# Patient Record
Sex: FEMALE | Race: WHITE | NOT HISPANIC OR LATINO | Employment: OTHER | URBAN - METROPOLITAN AREA
[De-identification: names, ages, dates, MRNs, and addresses within clinical notes are randomized per-mention and may not be internally consistent; named-entity substitution may affect disease eponyms.]

---

## 2023-03-24 LAB
ALANINE AMINOTRANSFERASE (SGPT) (U/L) IN SER/PLAS: 12 U/L (ref 7–45)
ALBUMIN (G/DL) IN SER/PLAS: 3.6 G/DL (ref 3.4–5)
ALKALINE PHOSPHATASE (U/L) IN SER/PLAS: 65 U/L (ref 33–136)
ANION GAP IN SER/PLAS: 10 MMOL/L (ref 10–20)
ASPARTATE AMINOTRANSFERASE (SGOT) (U/L) IN SER/PLAS: 10 U/L (ref 9–39)
BASOPHILS (10*3/UL) IN BLOOD BY AUTOMATED COUNT: 0.04 X10E9/L (ref 0–0.1)
BASOPHILS/100 LEUKOCYTES IN BLOOD BY AUTOMATED COUNT: 0.7 % (ref 0–2)
BILIRUBIN TOTAL (MG/DL) IN SER/PLAS: 0.6 MG/DL (ref 0–1.2)
CALCIUM (MG/DL) IN SER/PLAS: 9.2 MG/DL (ref 8.6–10.6)
CARBON DIOXIDE, TOTAL (MMOL/L) IN SER/PLAS: 28 MMOL/L (ref 21–32)
CHLORIDE (MMOL/L) IN SER/PLAS: 106 MMOL/L (ref 98–107)
CHOLESTEROL (MG/DL) IN SER/PLAS: 129 MG/DL (ref 0–199)
CHOLESTEROL IN HDL (MG/DL) IN SER/PLAS: 49.6 MG/DL
CHOLESTEROL/HDL RATIO: 2.6
COBALAMIN (VITAMIN B12) (PG/ML) IN SER/PLAS: 335 PG/ML (ref 211–911)
CREATININE (MG/DL) IN SER/PLAS: 1.41 MG/DL (ref 0.5–1.05)
EOSINOPHILS (10*3/UL) IN BLOOD BY AUTOMATED COUNT: 0.17 X10E9/L (ref 0–0.4)
EOSINOPHILS/100 LEUKOCYTES IN BLOOD BY AUTOMATED COUNT: 3.2 % (ref 0–6)
ERYTHROCYTE DISTRIBUTION WIDTH (RATIO) BY AUTOMATED COUNT: 14.8 % (ref 11.5–14.5)
ERYTHROCYTE MEAN CORPUSCULAR HEMOGLOBIN CONCENTRATION (G/DL) BY AUTOMATED: 31.6 G/DL (ref 32–36)
ERYTHROCYTE MEAN CORPUSCULAR VOLUME (FL) BY AUTOMATED COUNT: 96 FL (ref 80–100)
ERYTHROCYTES (10*6/UL) IN BLOOD BY AUTOMATED COUNT: 3.72 X10E12/L (ref 4–5.2)
ESTIMATED AVERAGE GLUCOSE FOR HBA1C: 123 MG/DL
FERRITIN (UG/LL) IN SER/PLAS: 77 UG/L (ref 8–150)
GFR FEMALE: 37 ML/MIN/1.73M2
GLUCOSE (MG/DL) IN SER/PLAS: 99 MG/DL (ref 74–99)
HEMATOCRIT (%) IN BLOOD BY AUTOMATED COUNT: 35.8 % (ref 36–46)
HEMOGLOBIN (G/DL) IN BLOOD: 11.3 G/DL (ref 12–16)
HEMOGLOBIN A1C/HEMOGLOBIN TOTAL IN BLOOD: 5.9 %
IMMATURE GRANULOCYTES/100 LEUKOCYTES IN BLOOD BY AUTOMATED COUNT: 0.4 % (ref 0–0.9)
IRON (UG/DL) IN SER/PLAS: 58 UG/DL (ref 35–150)
IRON BINDING CAPACITY (UG/DL) IN SER/PLAS: 372 UG/DL (ref 240–445)
IRON SATURATION (%) IN SER/PLAS: 16 % (ref 25–45)
LDL: 64 MG/DL (ref 0–99)
LEUKOCYTES (10*3/UL) IN BLOOD BY AUTOMATED COUNT: 5.4 X10E9/L (ref 4.4–11.3)
LYMPHOCYTES (10*3/UL) IN BLOOD BY AUTOMATED COUNT: 1.11 X10E9/L (ref 0.8–3)
LYMPHOCYTES/100 LEUKOCYTES IN BLOOD BY AUTOMATED COUNT: 20.6 % (ref 13–44)
MONOCYTES (10*3/UL) IN BLOOD BY AUTOMATED COUNT: 0.73 X10E9/L (ref 0.05–0.8)
MONOCYTES/100 LEUKOCYTES IN BLOOD BY AUTOMATED COUNT: 13.5 % (ref 2–10)
NEUTROPHILS (10*3/UL) IN BLOOD BY AUTOMATED COUNT: 3.32 X10E9/L (ref 1.6–5.5)
NEUTROPHILS/100 LEUKOCYTES IN BLOOD BY AUTOMATED COUNT: 61.6 % (ref 40–80)
NRBC (PER 100 WBCS) BY AUTOMATED COUNT: 0 /100 WBC (ref 0–0)
PLATELETS (10*3/UL) IN BLOOD AUTOMATED COUNT: 222 X10E9/L (ref 150–450)
POTASSIUM (MMOL/L) IN SER/PLAS: 4.6 MMOL/L (ref 3.5–5.3)
PROTEIN TOTAL: 6.2 G/DL (ref 6.4–8.2)
SODIUM (MMOL/L) IN SER/PLAS: 139 MMOL/L (ref 136–145)
TRIGLYCERIDE (MG/DL) IN SER/PLAS: 78 MG/DL (ref 0–149)
UREA NITROGEN (MG/DL) IN SER/PLAS: 40 MG/DL (ref 6–23)
VLDL: 16 MG/DL (ref 0–40)

## 2023-03-25 LAB — URINE CULTURE: NORMAL

## 2023-10-04 ENCOUNTER — OFFICE VISIT (OUTPATIENT)
Dept: PRIMARY CARE | Facility: CLINIC | Age: 83
End: 2023-10-04
Payer: MEDICARE

## 2023-10-04 ENCOUNTER — LAB (OUTPATIENT)
Dept: LAB | Facility: LAB | Age: 83
End: 2023-10-04
Payer: MEDICARE

## 2023-10-04 VITALS
HEART RATE: 104 BPM | BODY MASS INDEX: 29.59 KG/M2 | WEIGHT: 167 LBS | TEMPERATURE: 97.4 F | OXYGEN SATURATION: 97 % | DIASTOLIC BLOOD PRESSURE: 65 MMHG | HEIGHT: 63 IN | SYSTOLIC BLOOD PRESSURE: 115 MMHG

## 2023-10-04 DIAGNOSIS — R30.0 DYSURIA: ICD-10-CM

## 2023-10-04 DIAGNOSIS — R30.0 DYSURIA: Primary | ICD-10-CM

## 2023-10-04 DIAGNOSIS — J44.9 CHRONIC OBSTRUCTIVE PULMONARY DISEASE, UNSPECIFIED COPD TYPE (MULTI): ICD-10-CM

## 2023-10-04 DIAGNOSIS — N30.00 ACUTE CYSTITIS WITHOUT HEMATURIA: ICD-10-CM

## 2023-10-04 LAB
POC APPEARANCE, URINE: ABNORMAL
POC BILIRUBIN, URINE: NEGATIVE
POC BLOOD, URINE: ABNORMAL
POC COLOR, URINE: YELLOW
POC GLUCOSE, URINE: NEGATIVE MG/DL
POC KETONES, URINE: NEGATIVE MG/DL
POC LEUKOCYTES, URINE: ABNORMAL
POC NITRITE,URINE: NEGATIVE
POC PH, URINE: 5.5 PH
POC PROTEIN, URINE: NEGATIVE MG/DL
POC SPECIFIC GRAVITY, URINE: 1.01
POC UROBILINOGEN, URINE: 0.2 EU/DL

## 2023-10-04 PROCEDURE — 1160F RVW MEDS BY RX/DR IN RCRD: CPT | Performed by: INTERNAL MEDICINE

## 2023-10-04 PROCEDURE — 87186 SC STD MICRODIL/AGAR DIL: CPT

## 2023-10-04 PROCEDURE — 87086 URINE CULTURE/COLONY COUNT: CPT

## 2023-10-04 PROCEDURE — 1036F TOBACCO NON-USER: CPT | Performed by: INTERNAL MEDICINE

## 2023-10-04 PROCEDURE — 1159F MED LIST DOCD IN RCRD: CPT | Performed by: INTERNAL MEDICINE

## 2023-10-04 PROCEDURE — 1125F AMNT PAIN NOTED PAIN PRSNT: CPT | Performed by: INTERNAL MEDICINE

## 2023-10-04 PROCEDURE — 99213 OFFICE O/P EST LOW 20 MIN: CPT | Performed by: INTERNAL MEDICINE

## 2023-10-04 PROCEDURE — 81003 URINALYSIS AUTO W/O SCOPE: CPT | Performed by: INTERNAL MEDICINE

## 2023-10-04 RX ORDER — GUAIFENESIN 1200 MG
325 TABLET, EXTENDED RELEASE 12 HR ORAL 3 TIMES DAILY PRN
COMMUNITY
Start: 2019-02-14

## 2023-10-04 RX ORDER — LISINOPRIL 10 MG/1
TABLET ORAL
COMMUNITY
Start: 2017-11-07

## 2023-10-04 RX ORDER — SPIRONOLACTONE 25 MG/1
1 TABLET ORAL DAILY
COMMUNITY
Start: 2019-04-16 | End: 2023-10-26 | Stop reason: SINTOL

## 2023-10-04 RX ORDER — ALENDRONATE SODIUM 70 MG/1
TABLET ORAL
COMMUNITY
Start: 2022-09-22

## 2023-10-04 RX ORDER — SUCRALFATE 1 G/1
1 TABLET ORAL 3 TIMES DAILY
COMMUNITY
Start: 2022-08-30

## 2023-10-04 RX ORDER — SULFAMETHOXAZOLE AND TRIMETHOPRIM 800; 160 MG/1; MG/1
1 TABLET ORAL 2 TIMES DAILY
Qty: 14 TABLET | Refills: 0 | Status: SHIPPED | OUTPATIENT
Start: 2023-10-04 | End: 2023-10-11

## 2023-10-04 RX ORDER — GABAPENTIN 300 MG/1
300 CAPSULE ORAL 3 TIMES DAILY
COMMUNITY

## 2023-10-04 RX ORDER — ALBUTEROL SULFATE 90 UG/1
AEROSOL, METERED RESPIRATORY (INHALATION)
COMMUNITY
Start: 2018-05-21 | End: 2023-10-19 | Stop reason: SDUPTHER

## 2023-10-04 RX ORDER — RIVAROXABAN 20 MG/1
1 TABLET, FILM COATED ORAL
COMMUNITY
Start: 2015-04-07

## 2023-10-04 RX ORDER — TORSEMIDE 20 MG/1
TABLET ORAL
COMMUNITY
Start: 2022-12-29 | End: 2023-10-26 | Stop reason: SINTOL

## 2023-10-04 RX ORDER — CARVEDILOL 25 MG/1
TABLET ORAL
COMMUNITY
Start: 2017-02-23

## 2023-10-04 RX ORDER — FERROUS SULFATE 325(65) MG
TABLET, DELAYED RELEASE (ENTERIC COATED) ORAL
COMMUNITY

## 2023-10-04 RX ORDER — METHIMAZOLE 5 MG/1
2.5 TABLET ORAL DAILY
COMMUNITY
Start: 2016-08-29

## 2023-10-04 RX ORDER — OMEPRAZOLE 20 MG/1
20 CAPSULE, DELAYED RELEASE ORAL 2 TIMES DAILY
COMMUNITY
Start: 2023-09-25

## 2023-10-04 RX ORDER — FUROSEMIDE 20 MG/1
TABLET ORAL
COMMUNITY
Start: 2021-04-22 | End: 2023-10-04 | Stop reason: ALTCHOICE

## 2023-10-04 RX ORDER — MAGNESIUM 200 MG
1 TABLET ORAL DAILY
COMMUNITY
Start: 2023-03-23

## 2023-10-04 RX ORDER — ROPINIROLE 3 MG/1
3 TABLET, FILM COATED ORAL 2 TIMES DAILY
COMMUNITY
End: 2023-12-12 | Stop reason: SDUPTHER

## 2023-10-04 ASSESSMENT — PATIENT HEALTH QUESTIONNAIRE - PHQ9
SUM OF ALL RESPONSES TO PHQ9 QUESTIONS 1 AND 2: 0
2. FEELING DOWN, DEPRESSED OR HOPELESS: NOT AT ALL
1. LITTLE INTEREST OR PLEASURE IN DOING THINGS: NOT AT ALL

## 2023-10-04 ASSESSMENT — ENCOUNTER SYMPTOMS
DYSURIA: 1
VOMITING: 0
CONSTIPATION: 0
NAUSEA: 1
CHILLS: 1
FEVER: 0
FATIGUE: 1
FREQUENCY: 1

## 2023-10-04 ASSESSMENT — PAIN SCALES - GENERAL: PAINLEVEL: 6

## 2023-10-04 NOTE — PROGRESS NOTES
"Subjective   Patient ID: Yeni Almazan is a 83 y.o. female who presents for Follow-up (Abdominal cramps, urinary frequency, chills ).    HPI     Over the past week - stomach cramps, burning with urination, chills.  Feels \"sick to my stomach.\"  Had a bad night, could not sleep due to discomfort.  Gets a burning feeling to urinate and then has strong urgency to go.  She sometimes feels like she is not emptying bladder all the way.      Review of Systems   Constitutional:  Positive for chills and fatigue. Negative for fever.   Gastrointestinal:  Positive for nausea. Negative for constipation and vomiting.   Genitourinary:  Positive for dysuria, frequency, pelvic pain and urgency.       Objective   /65   Pulse 104   Temp 36.3 °C (97.4 °F) (Temporal)   Ht 1.6 m (5' 3\")   Wt 75.8 kg (167 lb)   SpO2 97%   BMI 29.58 kg/m²     Physical Exam  Constitutional:       Appearance: She is obese.   Cardiovascular:      Rate and Rhythm: Normal rate. Rhythm irregular.   Pulmonary:      Effort: Pulmonary effort is normal. No respiratory distress.      Breath sounds: Normal breath sounds. No wheezing.   Abdominal:      Palpations: Abdomen is soft.      Comments: States with palpation of LLQ, feels sensation that she needs to urinate.  No rebound / guarding.     Musculoskeletal:      Right lower leg: Edema present.      Left lower leg: Edema present.   Neurological:      Mental Status: She is alert and oriented to person, place, and time. Mental status is at baseline.   Psychiatric:         Mood and Affect: Mood normal.     UA in office with positive leukocytes        Assessment/Plan   Problem List Items Addressed This Visit       Chronic obstructive pulmonary disease, unspecified COPD type (CMS/[facility])     Other Visit Diagnoses       Dysuria    -  Primary    Relevant Medications    sulfamethoxazole-trimethoprim (Bactrim DS) 800-160 mg tablet    Other Relevant Orders    POCT UA Automated manually resulted (Completed)    Urine " Culture    Acute cystitis without hematuria              UTI, dysuria - UA with positive leukocytes.  Send urine for culture.  Rx sent for Bactrim x 7 days (states she's taken it in the past with good results).  Follow-up if symptoms worsen or fail to improve.    COPD - no active issues, uses albuterol PRN.    Follow-up as scheduled.

## 2023-10-07 LAB — BACTERIA UR CULT: ABNORMAL

## 2023-10-08 NOTE — RESULT ENCOUNTER NOTE
Please advise patient that urine culture is positive, bacteria is sensitive to Bactrim, so should complete the antibiotic as prescribed.  Dr. Avila

## 2023-10-12 LAB
NON-UH HIE BUN/CREAT RATIO: 26.2
NON-UH HIE BUN: 123 MG/DL (ref 9–23)
NON-UH HIE CALCIUM: 9.4 MG/DL (ref 8.7–10.4)
NON-UH HIE CALCULATED OSMOLALITY: 311 MOSM/KG (ref 275–295)
NON-UH HIE CHLORIDE: 105 MMOL/L (ref 98–107)
NON-UH HIE CO2, VENOUS: 23 MMOL/L (ref 20–31)
NON-UH HIE CREATININE: 4.7 MG/DL (ref 0.5–0.8)
NON-UH HIE GFR AA: 11
NON-UH HIE GLOMERULAR FILTRATION RATE: 9 ML/MIN/1.73M?
NON-UH HIE GLUCOSE: 116 MG/DL (ref 74–106)
NON-UH HIE K: 6 MMOL/L (ref 3.5–5.1)
NON-UH HIE NA: 135 MMOL/L (ref 135–145)

## 2023-10-13 ENCOUNTER — DOCUMENTATION (OUTPATIENT)
Dept: RHEUMATOLOGY | Facility: HOSPITAL | Age: 83
End: 2023-10-13
Payer: COMMERCIAL

## 2023-10-19 ENCOUNTER — TELEPHONE (OUTPATIENT)
Dept: PRIMARY CARE | Facility: CLINIC | Age: 83
End: 2023-10-19
Payer: COMMERCIAL

## 2023-10-19 DIAGNOSIS — N30.00 ACUTE CYSTITIS WITHOUT HEMATURIA: Primary | ICD-10-CM

## 2023-10-19 DIAGNOSIS — R30.0 DYSURIA: ICD-10-CM

## 2023-10-19 DIAGNOSIS — J44.9 CHRONIC OBSTRUCTIVE PULMONARY DISEASE, UNSPECIFIED COPD TYPE (MULTI): Primary | ICD-10-CM

## 2023-10-19 RX ORDER — CIPROFLOXACIN 250 MG/1
250 TABLET, FILM COATED ORAL 2 TIMES DAILY
Qty: 14 TABLET | Refills: 0 | Status: SHIPPED | OUTPATIENT
Start: 2023-10-19 | End: 2023-10-26

## 2023-10-19 RX ORDER — ALBUTEROL SULFATE 90 UG/1
2 AEROSOL, METERED RESPIRATORY (INHALATION) EVERY 6 HOURS PRN
Qty: 18 G | Refills: 3 | Status: SHIPPED | OUTPATIENT
Start: 2023-10-19

## 2023-10-19 NOTE — TELEPHONE ENCOUNTER
Patient called to schedule hospital followup but needs a refill on her albuterol inhaler.    RX  JANA DEMPSEY

## 2023-10-23 ENCOUNTER — TELEPHONE (OUTPATIENT)
Dept: PRIMARY CARE | Facility: CLINIC | Age: 83
End: 2023-10-23
Payer: COMMERCIAL

## 2023-10-23 NOTE — TELEPHONE ENCOUNTER
Patient left message on non urgent line stating she is taking cipro for urine infection.  Patient she doesn't feel the medication is helping.  Patient still has burning and sometimes it is real bad.  Patient phone [phone].

## 2023-10-24 PROBLEM — J44.9 COPD (CHRONIC OBSTRUCTIVE PULMONARY DISEASE) (MULTI): Status: ACTIVE | Noted: 2023-10-24

## 2023-10-24 PROBLEM — E05.90 THYROTOXICOSIS: Status: ACTIVE | Noted: 2023-10-24

## 2023-10-24 PROBLEM — M40.209 KYPHOSIS: Status: ACTIVE | Noted: 2023-10-24

## 2023-10-24 PROBLEM — N32.81 OVERACTIVE BLADDER: Status: ACTIVE | Noted: 2023-10-24

## 2023-10-24 PROBLEM — M79.669 CALF PAIN: Status: ACTIVE | Noted: 2023-10-24

## 2023-10-24 PROBLEM — N17.9 ACUTE KIDNEY INJURY (CMS-HCC): Status: ACTIVE | Noted: 2023-10-13

## 2023-10-24 PROBLEM — K21.9 GASTROESOPHAGEAL REFLUX DISEASE: Status: ACTIVE | Noted: 2023-10-24

## 2023-10-24 PROBLEM — F41.9 ANXIETY: Status: ACTIVE | Noted: 2023-10-24

## 2023-10-24 PROBLEM — H35.30 MACULAR DEGENERATION: Status: ACTIVE | Noted: 2023-10-24

## 2023-10-24 PROBLEM — M19.90 ARTHRITIS: Status: ACTIVE | Noted: 2023-10-24

## 2023-10-24 PROBLEM — E05.90 HYPERTHYROIDISM: Status: ACTIVE | Noted: 2023-09-29

## 2023-10-24 PROBLEM — G62.9 PERIPHERAL NEUROPATHY: Status: ACTIVE | Noted: 2023-10-24

## 2023-10-24 PROBLEM — Z86.73 HISTORY OF CVA (CEREBROVASCULAR ACCIDENT): Status: ACTIVE | Noted: 2023-10-24

## 2023-10-24 PROBLEM — R25.2 LEG CRAMPS: Status: ACTIVE | Noted: 2023-10-24

## 2023-10-24 PROBLEM — T46.2X5A AMIODARONE-INDUCED HYPERTHYROIDISM: Status: ACTIVE | Noted: 2023-10-24

## 2023-10-24 PROBLEM — I10 BENIGN ESSENTIAL HYPERTENSION: Status: ACTIVE | Noted: 2017-02-23

## 2023-10-24 PROBLEM — G25.81 RESTLESS LEGS SYNDROME: Status: ACTIVE | Noted: 2023-10-24

## 2023-10-24 PROBLEM — J30.9 ALLERGIC RHINITIS: Status: ACTIVE | Noted: 2023-10-24

## 2023-10-24 PROBLEM — E04.2 MULTINODULAR GOITER: Status: ACTIVE | Noted: 2023-09-29

## 2023-10-24 PROBLEM — M79.673 CHRONIC FOOT PAIN: Status: ACTIVE | Noted: 2023-10-24

## 2023-10-24 PROBLEM — N39.0 RECURRENT URINARY TRACT INFECTION: Status: ACTIVE | Noted: 2023-10-24

## 2023-10-24 PROBLEM — E53.8 VITAMIN B 12 DEFICIENCY: Status: ACTIVE | Noted: 2023-10-24

## 2023-10-24 PROBLEM — M81.0 OSTEOPOROSIS: Status: ACTIVE | Noted: 2023-09-29

## 2023-10-24 PROBLEM — I10 HIGH BLOOD PRESSURE: Status: ACTIVE | Noted: 2023-10-24

## 2023-10-24 PROBLEM — E04.1 THYROID NODULE: Status: ACTIVE | Noted: 2023-10-24

## 2023-10-24 PROBLEM — R06.02 SHORTNESS OF BREATH ON EXERTION: Status: ACTIVE | Noted: 2023-10-24

## 2023-10-24 PROBLEM — K44.9 HIATAL HERNIA: Status: ACTIVE | Noted: 2023-10-24

## 2023-10-24 PROBLEM — M79.89 CALF SWELLING: Status: ACTIVE | Noted: 2023-10-24

## 2023-10-24 PROBLEM — R73.01 IMPAIRED FASTING GLUCOSE: Status: ACTIVE | Noted: 2023-10-24

## 2023-10-24 PROBLEM — G89.29 CHRONIC FOOT PAIN: Status: ACTIVE | Noted: 2023-10-24

## 2023-10-24 PROBLEM — M54.9 UPPER BACK PAIN: Status: ACTIVE | Noted: 2023-10-24

## 2023-10-24 PROBLEM — I51.9 LEFT VENTRICULAR DIASTOLIC DYSFUNCTION: Status: ACTIVE | Noted: 2023-10-24

## 2023-10-24 PROBLEM — M51.34 DEGENERATION OF INTERVERTEBRAL DISC OF THORACIC REGION: Status: ACTIVE | Noted: 2023-10-24

## 2023-10-24 PROBLEM — E05.80 AMIODARONE-INDUCED HYPERTHYROIDISM: Status: ACTIVE | Noted: 2023-10-24

## 2023-10-24 PROBLEM — I63.9 STROKE (MULTI): Status: ACTIVE | Noted: 2023-10-24

## 2023-10-24 PROBLEM — R11.0 MODERATE NAUSEA: Status: ACTIVE | Noted: 2023-10-24

## 2023-10-24 PROBLEM — Z99.89 AMBULATES WITH CANE: Status: ACTIVE | Noted: 2023-10-24

## 2023-10-24 PROBLEM — E04.1 SOLITARY THYROID NODULE: Status: ACTIVE | Noted: 2023-10-24

## 2023-10-24 PROBLEM — R60.9 DEPENDENT EDEMA: Status: ACTIVE | Noted: 2023-10-24

## 2023-10-24 PROBLEM — E87.5 HYPERKALEMIA: Status: ACTIVE | Noted: 2023-10-13

## 2023-10-24 RX ORDER — BETAMETHASONE DIPROPIONATE 0.5 MG/G
CREAM TOPICAL 2 TIMES DAILY
COMMUNITY
Start: 2023-09-20

## 2023-10-24 RX ORDER — FUROSEMIDE 20 MG/1
20 TABLET ORAL DAILY
COMMUNITY
Start: 2023-10-12 | End: 2023-10-26

## 2023-10-24 RX ORDER — MULTIVITAMIN
1 TABLET ORAL DAILY
COMMUNITY
Start: 2023-10-13

## 2023-10-24 RX ORDER — SPIRONOLACTONE AND HYDROCHLOROTHIAZIDE 25; 25 MG/1; MG/1
1 TABLET ORAL DAILY
COMMUNITY
Start: 2018-10-29 | End: 2023-10-26 | Stop reason: SINTOL

## 2023-10-24 RX ORDER — HYDROCHLOROTHIAZIDE 25 MG/1
25 TABLET ORAL DAILY
COMMUNITY
Start: 2023-10-16 | End: 2023-11-15 | Stop reason: SDUPTHER

## 2023-10-25 PROBLEM — R20.0 BILATERAL HAND NUMBNESS: Status: ACTIVE | Noted: 2023-10-25

## 2023-10-25 RX ORDER — LOSARTAN POTASSIUM 100 MG/1
100 TABLET ORAL
COMMUNITY
End: 2023-10-26

## 2023-10-25 RX ORDER — OXYBUTYNIN CHLORIDE 5 MG/1
5 TABLET ORAL NIGHTLY
COMMUNITY
End: 2023-11-13 | Stop reason: ALTCHOICE

## 2023-10-25 RX ORDER — MAG CARB/ALUMINUM HYDROX/ALGIN 358-95/15
SUSPENSION, ORAL (FINAL DOSE FORM) ORAL AS NEEDED
COMMUNITY

## 2023-10-26 ENCOUNTER — OFFICE VISIT (OUTPATIENT)
Dept: PRIMARY CARE | Facility: CLINIC | Age: 83
End: 2023-10-26
Payer: MEDICARE

## 2023-10-26 ENCOUNTER — LAB (OUTPATIENT)
Dept: LAB | Facility: LAB | Age: 83
End: 2023-10-26
Payer: MEDICARE

## 2023-10-26 VITALS
OXYGEN SATURATION: 93 % | HEART RATE: 90 BPM | TEMPERATURE: 97.3 F | DIASTOLIC BLOOD PRESSURE: 66 MMHG | SYSTOLIC BLOOD PRESSURE: 118 MMHG

## 2023-10-26 DIAGNOSIS — E87.5 HYPERKALEMIA: ICD-10-CM

## 2023-10-26 DIAGNOSIS — N30.00 ACUTE CYSTITIS WITHOUT HEMATURIA: ICD-10-CM

## 2023-10-26 DIAGNOSIS — R30.0 DYSURIA: Primary | ICD-10-CM

## 2023-10-26 LAB
POC APPEARANCE, URINE: ABNORMAL
POC BILIRUBIN, URINE: NEGATIVE
POC BLOOD, URINE: ABNORMAL
POC COLOR, URINE: YELLOW
POC GLUCOSE, URINE: NEGATIVE MG/DL
POC KETONES, URINE: NEGATIVE MG/DL
POC LEUKOCYTES, URINE: ABNORMAL
POC NITRITE,URINE: POSITIVE
POC PH, URINE: 5.5 PH
POC PROTEIN, URINE: NEGATIVE MG/DL
POC SPECIFIC GRAVITY, URINE: <=1.005
POC UROBILINOGEN, URINE: 0.2 EU/DL

## 2023-10-26 PROCEDURE — 36415 COLL VENOUS BLD VENIPUNCTURE: CPT

## 2023-10-26 PROCEDURE — 1036F TOBACCO NON-USER: CPT | Performed by: INTERNAL MEDICINE

## 2023-10-26 PROCEDURE — 80048 BASIC METABOLIC PNL TOTAL CA: CPT

## 2023-10-26 PROCEDURE — 1160F RVW MEDS BY RX/DR IN RCRD: CPT | Performed by: INTERNAL MEDICINE

## 2023-10-26 PROCEDURE — 81003 URINALYSIS AUTO W/O SCOPE: CPT | Performed by: INTERNAL MEDICINE

## 2023-10-26 PROCEDURE — 1125F AMNT PAIN NOTED PAIN PRSNT: CPT | Performed by: INTERNAL MEDICINE

## 2023-10-26 PROCEDURE — 99213 OFFICE O/P EST LOW 20 MIN: CPT | Performed by: INTERNAL MEDICINE

## 2023-10-26 PROCEDURE — 3074F SYST BP LT 130 MM HG: CPT | Performed by: INTERNAL MEDICINE

## 2023-10-26 PROCEDURE — 87086 URINE CULTURE/COLONY COUNT: CPT

## 2023-10-26 PROCEDURE — 1159F MED LIST DOCD IN RCRD: CPT | Performed by: INTERNAL MEDICINE

## 2023-10-26 PROCEDURE — 3078F DIAST BP <80 MM HG: CPT | Performed by: INTERNAL MEDICINE

## 2023-10-26 PROCEDURE — 87186 SC STD MICRODIL/AGAR DIL: CPT

## 2023-10-26 RX ORDER — CIPROFLOXACIN 250 MG/1
250 TABLET, FILM COATED ORAL 2 TIMES DAILY
Qty: 14 TABLET | Refills: 0 | Status: SHIPPED | OUTPATIENT
Start: 2023-10-26 | End: 2023-11-02

## 2023-10-26 ASSESSMENT — PATIENT HEALTH QUESTIONNAIRE - PHQ9
2. FEELING DOWN, DEPRESSED OR HOPELESS: NOT AT ALL
SUM OF ALL RESPONSES TO PHQ9 QUESTIONS 1 AND 2: 0
1. LITTLE INTEREST OR PLEASURE IN DOING THINGS: NOT AT ALL

## 2023-10-26 NOTE — PROGRESS NOTES
Subjective   Patient ID: Yeni Almazan is a 83 y.o. female who presents for Hospital Follow-up / UTI symptoms.    HPI     Seen in office 10/4 for UTI symptoms.  UA suggested UTI, started on Bactrim.  Urine culture came back showing Klebsiella pneumoniae, sensitive to Bactrim.    In the meantime, she went for labs for another provider.  This showed hyperkalemia and increase in creatinine.  She was advised to go to ER.  She was hospitalized 10/12 - 10/16.  Urine culture on 10/13 showed no growth.    She called and spoke with Dr. Olivier on 10/19 - he called in another course of Cipro.    Primary symptom today is burning with urination.  It's very painful.  No gross blood in urine.  Feels cold at times, however no fever or rigors.  When she had called office previously, I was concerned that her urine culture was negative and she was taking antibiotics without relief, suggesting cause other than UTI.  I put in a referral to a urologist.   She is scheduled for 11/1.        Review of Systems   Constitutional:  Positive for fatigue. Negative for fever.   Genitourinary:  Positive for dysuria, frequency and urgency. Negative for hematuria.   Psychiatric/Behavioral:  Negative for confusion.        Objective   /66   Pulse 90   Temp 36.3 °C (97.3 °F)   SpO2 93%     Physical Exam  Cardiovascular:      Rate and Rhythm: Normal rate and regular rhythm.      Heart sounds: Normal heart sounds. No murmur heard.  Pulmonary:      Effort: Pulmonary effort is normal. No respiratory distress.      Breath sounds: Normal breath sounds. No wheezing.   Neurological:      General: No focal deficit present.      Mental Status: She is alert and oriented to person, place, and time. Mental status is at baseline.   Psychiatric:         Mood and Affect: Mood normal.         Assessment/Plan   Problem List Items Addressed This Visit             ICD-10-CM    Hyperkalemia E87.5    Relevant Orders    Basic metabolic panel (Completed)     Other  Visit Diagnoses         Codes    Dysuria    -  Primary R30.0    Relevant Orders    POCT UA Automated manually resulted (Completed)    Acute cystitis without hematuria     N30.00    Relevant Medications    ciprofloxacin (Cipro) 250 mg tablet    Other Relevant Orders    Urine Culture            Dysuria - UA today suggests UTI.  Urine sent for culture.  Rx sent for Cipro.  Keep appointment with urologist.    Hyperkalemia, recent acute renal failure - recheck BMP today.    Follow-up as scheduled.

## 2023-10-27 LAB
ANION GAP SERPL CALC-SCNC: 13 MMOL/L (ref 10–20)
BUN SERPL-MCNC: 22 MG/DL (ref 6–23)
CALCIUM SERPL-MCNC: 9.1 MG/DL (ref 8.6–10.6)
CHLORIDE SERPL-SCNC: 101 MMOL/L (ref 98–107)
CO2 SERPL-SCNC: 27 MMOL/L (ref 21–32)
CREAT SERPL-MCNC: 1.12 MG/DL (ref 0.5–1.05)
GFR SERPL CREATININE-BSD FRML MDRD: 49 ML/MIN/1.73M*2
GLUCOSE SERPL-MCNC: 98 MG/DL (ref 74–99)
POTASSIUM SERPL-SCNC: 4.6 MMOL/L (ref 3.5–5.3)
SODIUM SERPL-SCNC: 136 MMOL/L (ref 136–145)

## 2023-10-27 ASSESSMENT — ENCOUNTER SYMPTOMS
FREQUENCY: 1
HEMATURIA: 0
FATIGUE: 1
DYSURIA: 1
FEVER: 0
CONFUSION: 0

## 2023-10-29 LAB — BACTERIA UR CULT: ABNORMAL

## 2023-10-30 DIAGNOSIS — N30.00 ACUTE CYSTITIS WITHOUT HEMATURIA: Primary | ICD-10-CM

## 2023-10-30 RX ORDER — NITROFURANTOIN 25; 75 MG/1; MG/1
100 CAPSULE ORAL 2 TIMES DAILY
Qty: 14 CAPSULE | Refills: 0 | Status: SHIPPED | OUTPATIENT
Start: 2023-10-30 | End: 2023-11-06

## 2023-11-01 ENCOUNTER — APPOINTMENT (OUTPATIENT)
Dept: UROLOGY | Facility: CLINIC | Age: 83
End: 2023-11-01
Payer: MEDICARE

## 2023-11-13 ENCOUNTER — OFFICE VISIT (OUTPATIENT)
Dept: UROLOGY | Facility: CLINIC | Age: 83
End: 2023-11-13
Payer: MEDICARE

## 2023-11-13 VITALS
SYSTOLIC BLOOD PRESSURE: 171 MMHG | HEART RATE: 120 BPM | WEIGHT: 167 LBS | DIASTOLIC BLOOD PRESSURE: 97 MMHG | BODY MASS INDEX: 29.59 KG/M2 | HEIGHT: 63 IN | TEMPERATURE: 96.6 F

## 2023-11-13 DIAGNOSIS — N95.8 GENITOURINARY SYNDROME OF MENOPAUSE: ICD-10-CM

## 2023-11-13 DIAGNOSIS — R30.0 DYSURIA: ICD-10-CM

## 2023-11-13 DIAGNOSIS — N32.81 OAB (OVERACTIVE BLADDER): Primary | ICD-10-CM

## 2023-11-13 DIAGNOSIS — R35.0 FREQUENCY OF URINATION: ICD-10-CM

## 2023-11-13 LAB
APPEARANCE UR: ABNORMAL
BACTERIA #/AREA URNS AUTO: ABNORMAL /HPF
BILIRUB UR STRIP.AUTO-MCNC: NEGATIVE MG/DL
COLOR UR: YELLOW
GLUCOSE UR STRIP.AUTO-MCNC: NEGATIVE MG/DL
KETONES UR STRIP.AUTO-MCNC: ABNORMAL MG/DL
LEUKOCYTE ESTERASE UR QL STRIP.AUTO: ABNORMAL
NITRITE UR QL STRIP.AUTO: NEGATIVE
PH UR STRIP.AUTO: 5 [PH]
POC APPEARANCE, URINE: CLEAR
POC BILIRUBIN, URINE: NEGATIVE
POC BLOOD, URINE: ABNORMAL
POC COLOR, URINE: YELLOW
POC GLUCOSE, URINE: NEGATIVE MG/DL
POC KETONES, URINE: ABNORMAL MG/DL
POC LEUKOCYTES, URINE: ABNORMAL
POC NITRITE,URINE: NEGATIVE
POC PH, URINE: 5.5 PH
POC PROTEIN, URINE: ABNORMAL MG/DL
POC SPECIFIC GRAVITY, URINE: 1.02
POC UROBILINOGEN, URINE: 0.2 EU/DL
PROT UR STRIP.AUTO-MCNC: ABNORMAL MG/DL
RBC # UR STRIP.AUTO: ABNORMAL /UL
RBC #/AREA URNS AUTO: ABNORMAL /HPF
SP GR UR STRIP.AUTO: 1.01
SQUAMOUS #/AREA URNS AUTO: ABNORMAL /HPF
UROBILINOGEN UR STRIP.AUTO-MCNC: <2 MG/DL
WBC #/AREA URNS AUTO: >50 /HPF

## 2023-11-13 PROCEDURE — 1159F MED LIST DOCD IN RCRD: CPT | Performed by: NURSE PRACTITIONER

## 2023-11-13 PROCEDURE — 1036F TOBACCO NON-USER: CPT | Performed by: NURSE PRACTITIONER

## 2023-11-13 PROCEDURE — 81001 URINALYSIS AUTO W/SCOPE: CPT

## 2023-11-13 PROCEDURE — 3080F DIAST BP >= 90 MM HG: CPT | Performed by: NURSE PRACTITIONER

## 2023-11-13 PROCEDURE — 87086 URINE CULTURE/COLONY COUNT: CPT

## 2023-11-13 PROCEDURE — 81003 URINALYSIS AUTO W/O SCOPE: CPT | Performed by: NURSE PRACTITIONER

## 2023-11-13 PROCEDURE — 99214 OFFICE O/P EST MOD 30 MIN: CPT | Performed by: NURSE PRACTITIONER

## 2023-11-13 PROCEDURE — 3077F SYST BP >= 140 MM HG: CPT | Performed by: NURSE PRACTITIONER

## 2023-11-13 PROCEDURE — 51798 US URINE CAPACITY MEASURE: CPT | Performed by: NURSE PRACTITIONER

## 2023-11-13 PROCEDURE — 1125F AMNT PAIN NOTED PAIN PRSNT: CPT | Performed by: NURSE PRACTITIONER

## 2023-11-13 PROCEDURE — 1160F RVW MEDS BY RX/DR IN RCRD: CPT | Performed by: NURSE PRACTITIONER

## 2023-11-13 RX ORDER — SOLIFENACIN SUCCINATE 5 MG/1
5 TABLET, FILM COATED ORAL DAILY
Qty: 30 TABLET | Refills: 5 | Status: SHIPPED | OUTPATIENT
Start: 2023-11-13 | End: 2024-01-03 | Stop reason: ALTCHOICE

## 2023-11-13 RX ORDER — ESTRADIOL 0.1 MG/G
CREAM VAGINAL
Qty: 42.5 G | Refills: 5 | Status: SHIPPED | OUTPATIENT
Start: 2023-11-13 | End: 2024-11-12

## 2023-11-13 NOTE — PROGRESS NOTES
"11/13/23   [id number]    Overactive bladder, two UTI recently     Subjective      HPI Yeni Almazan is a 83 y.o. female who presents for overactive bladder and frequent UTI recently in October, previously no UTI for a while, recently on two abx for UTI (bactrim and Cipro); yesterday noticed small burning, urgency and frequency about the same 8 pads per day; no constipation;     UA small heme, large leuk, PVR 54 ml    No hematuria, maybe sensation of prolapse?    Uses cane, son brought in wheel chair for visit;     10/26/23 Ecoli  10/4/23 klebsiella  3/23/23 mixed urethral felice        Objective     BP (!) 171/97 (BP Location: Right arm, Patient Position: Sitting, BP Cuff Size: Large adult)   Pulse (!) 120   Temp 35.9 °C (96.6 °F) (Temporal)   Ht 1.6 m (5' 3\")   Wt 75.8 kg (167 lb)   BMI 29.58 kg/m²    Physical Exam  General: Appears comfortable and in no apparent distress, well nourished  Head: Normocephalic, atraumatic  Neck: trachea midline  Respiratory: respirations unlabored, no wheezes, and no use of accessory muscles  Cardiovascular: at rest no dyspnea, well perfused  Skin: no visible rashes or lesions  Neurologic: grossly intact, oriented to person, place, and time  Psychiatric: mood and affect appropriate  Musculoskeletal: in chair for appt. no difficulty w upper body movement, uses cane to ambulate;       Assessment/Plan   Problem List Items Addressed This Visit    None  Visit Diagnoses       OAB (overactive bladder)    -  Primary    Relevant Orders    Post-Void Residual (Completed)    Dysuria        Relevant Orders    POCT UA Automated manually resulted (Completed)    Frequency of urination        Relevant Orders    Post-Void Residual (Completed)          Orders Placed This Encounter   Procedures    Post-Void Residual    POCT UA Automated manually resulted     Order Specific Question:   Release result to Fiksu     Answer:   Immediate [1]             Solifenacin 5 mg daily with breakfast  Estrogen cream " pea on finger nightly x 3 weeks, then 3 x per week thereafter  Dmanose 2000 mg daily (amazon powder or pill) for  UTI prevention  Follow up Noem[facility] 4-6 weeks pelvic exam, PVR  Nurse line [phone]  Lab Results   Component Value Date    GLUCOSE 98 10/26/2023    CALCIUM 9.1 10/26/2023     10/26/2023    K 4.6 10/26/2023    CO2 27 10/26/2023     10/26/2023    BUN 22 10/26/2023    CREATININE 1.12 (H) 10/26/2023

## 2023-11-13 NOTE — PATIENT INSTRUCTIONS
Solifenacin 5 mg daily with breakfast  Estrogen cream pea on finger nightly x 3 weeks, then 3 x per week thereafter  Dmanose 2000 mg daily (amazon powder or pill) for  UTI prevention  Follow up Noem[facility] 4-6 weeks pelvic exam, PVR  Nurse line [phone]

## 2023-11-14 LAB — BACTERIA UR CULT: NORMAL

## 2023-11-15 ENCOUNTER — TELEPHONE (OUTPATIENT)
Dept: PRIMARY CARE | Facility: CLINIC | Age: 83
End: 2023-11-15
Payer: COMMERCIAL

## 2023-11-15 ENCOUNTER — TELEPHONE (OUTPATIENT)
Dept: UROLOGY | Facility: CLINIC | Age: 83
End: 2023-11-15
Payer: COMMERCIAL

## 2023-11-15 DIAGNOSIS — I10 BENIGN ESSENTIAL HYPERTENSION: Primary | ICD-10-CM

## 2023-11-15 RX ORDER — HYDROCHLOROTHIAZIDE 25 MG/1
25 TABLET ORAL DAILY
Qty: 90 TABLET | Refills: 3 | Status: SHIPPED | OUTPATIENT
Start: 2023-11-15 | End: 2024-11-14

## 2023-11-15 NOTE — TELEPHONE ENCOUNTER
Patient was put on hydrochlorothiazide 25 mg  in the hospital.  She assumes that she should still be on it but she is out    RX  GE BR

## 2023-11-15 NOTE — TELEPHONE ENCOUNTER
----- Message from JEFF Jean sent at 11/15/2023  5:04 PM EST -----  Infection cleared up, ty  ----- Message -----  From: Hari Moore MA  Sent: 11/13/2023   9:46 AM EST  To: JEFF Jean

## 2023-12-12 ENCOUNTER — TELEPHONE (OUTPATIENT)
Dept: PRIMARY CARE | Facility: CLINIC | Age: 83
End: 2023-12-12
Payer: COMMERCIAL

## 2023-12-12 DIAGNOSIS — G25.81 RESTLESS LEGS SYNDROME: Primary | ICD-10-CM

## 2023-12-12 RX ORDER — ROPINIROLE 3 MG/1
3 TABLET, FILM COATED ORAL 2 TIMES DAILY
Qty: 60 TABLET | Refills: 11 | Status: SHIPPED | OUTPATIENT
Start: 2023-12-12

## 2023-12-12 NOTE — TELEPHONE ENCOUNTER
[facility] Fax Refill Authorization Request-  Rx refill-ropinirole HCl Oral Tablet 3 mg take one tablet by mouth two times a day  Orig Qty 60  Org refills 11  Ph-Cristo Jose [phone]

## 2023-12-18 ENCOUNTER — TELEPHONE (OUTPATIENT)
Dept: UROLOGY | Facility: CLINIC | Age: 83
End: 2023-12-18
Payer: COMMERCIAL

## 2023-12-18 DIAGNOSIS — R30.0 DYSURIA: Primary | ICD-10-CM

## 2023-12-18 NOTE — PROGRESS NOTES
Pt left message stating she has another UTI. Orders placed and calling pt to have her drop off a urine. Please advise on treatment, thanks.

## 2023-12-18 NOTE — TELEPHONE ENCOUNTER
Pt left message stating she has nother UTI. Orders placed and calling pt to have her drop off urine. Please advise on treatment, thanks.

## 2023-12-20 ENCOUNTER — LAB (OUTPATIENT)
Dept: LAB | Facility: LAB | Age: 83
End: 2023-12-20
Payer: MEDICARE

## 2023-12-20 DIAGNOSIS — R30.0 DYSURIA: ICD-10-CM

## 2023-12-20 LAB
MUCOUS THREADS #/AREA URNS AUTO: NORMAL /LPF
RBC #/AREA URNS AUTO: NORMAL /HPF
SQUAMOUS #/AREA URNS AUTO: NORMAL /HPF
WBC #/AREA URNS AUTO: NORMAL /HPF

## 2023-12-20 PROCEDURE — 81001 URINALYSIS AUTO W/SCOPE: CPT

## 2023-12-20 PROCEDURE — 87086 URINE CULTURE/COLONY COUNT: CPT

## 2023-12-21 LAB — BACTERIA UR CULT: NORMAL

## 2023-12-21 NOTE — TELEPHONE ENCOUNTER
"Pt informed and states her incontinence is the worst part. Every time she stands up \"it splashes out.\" I told her we can discuss that further at her follow up appt on jan. 3rd and we will check her urine again when she is here.  "

## 2023-12-21 NOTE — TELEPHONE ENCOUNTER
----- Message from JEFF Jean sent at 12/21/2023  5:02 PM EST -----  No infection. ty  ----- Message -----  From: Lab, Background User  Sent: 12/20/2023   8:46 PM EST  To: JEFF Jean

## 2024-01-03 ENCOUNTER — OFFICE VISIT (OUTPATIENT)
Dept: UROLOGY | Facility: CLINIC | Age: 84
End: 2024-01-03
Payer: MEDICARE

## 2024-01-03 VITALS
BODY MASS INDEX: 29.58 KG/M2 | SYSTOLIC BLOOD PRESSURE: 177 MMHG | WEIGHT: 167 LBS | DIASTOLIC BLOOD PRESSURE: 108 MMHG | HEART RATE: 119 BPM | TEMPERATURE: 96.6 F

## 2024-01-03 DIAGNOSIS — N95.8 GENITOURINARY SYNDROME OF MENOPAUSE: ICD-10-CM

## 2024-01-03 DIAGNOSIS — N39.0 RECURRENT UTI: ICD-10-CM

## 2024-01-03 DIAGNOSIS — R35.0 FREQUENCY OF URINATION: Primary | ICD-10-CM

## 2024-01-03 LAB
POC APPEARANCE, URINE: CLEAR
POC BILIRUBIN, URINE: NEGATIVE
POC BLOOD, URINE: NEGATIVE
POC COLOR, URINE: YELLOW
POC GLUCOSE, URINE: NEGATIVE MG/DL
POC KETONES, URINE: NEGATIVE MG/DL
POC LEUKOCYTES, URINE: ABNORMAL
POC NITRITE,URINE: NEGATIVE
POC PH, URINE: 5.5 PH
POC PROTEIN, URINE: NEGATIVE MG/DL
POC SPECIFIC GRAVITY, URINE: 1.01
POC UROBILINOGEN, URINE: 0.2 EU/DL

## 2024-01-03 PROCEDURE — 81003 URINALYSIS AUTO W/O SCOPE: CPT | Performed by: NURSE PRACTITIONER

## 2024-01-03 PROCEDURE — 99213 OFFICE O/P EST LOW 20 MIN: CPT | Performed by: NURSE PRACTITIONER

## 2024-01-03 PROCEDURE — 1036F TOBACCO NON-USER: CPT | Performed by: NURSE PRACTITIONER

## 2024-01-03 PROCEDURE — 1159F MED LIST DOCD IN RCRD: CPT | Performed by: NURSE PRACTITIONER

## 2024-01-03 PROCEDURE — 3077F SYST BP >= 140 MM HG: CPT | Performed by: NURSE PRACTITIONER

## 2024-01-03 PROCEDURE — 3080F DIAST BP >= 90 MM HG: CPT | Performed by: NURSE PRACTITIONER

## 2024-01-03 PROCEDURE — 1125F AMNT PAIN NOTED PAIN PRSNT: CPT | Performed by: NURSE PRACTITIONER

## 2024-01-03 RX ORDER — SOLIFENACIN SUCCINATE 10 MG/1
10 TABLET, FILM COATED ORAL DAILY
Qty: 30 TABLET | Refills: 5 | Status: SHIPPED | OUTPATIENT
Start: 2024-01-03 | End: 2024-03-25

## 2024-01-03 NOTE — PROGRESS NOTES
01/03/24   [id number]    Follow up OAB and recurrent UTI. Follow up medication response solifenacin 5 mg daily; pelvic exam;      Subjective      HPI Yeni Almazan is a 83 y.o. female who presents for follow up OAB, recurrent UTIs, last visit in Nov, started on solifenacin 5 mg daily, Dmanose 2000 mg daily, estrogen vaginal cream;     12/20/23 urine culture no growth, microscopy rbc 1-2/hpf, wbc 1-5/hpf;     11/13/23 urine culture no growth, urine appeared contaminated w 51-75 epi cells; wbc and rbc maybe related to just being treated for UTI two weeks prior on 10/26/23 Ecoli + culture;     10/4/23 klebsiella + urine culture    Objective     There were no vitals taken for this visit.   Physical Exam  General: Appears comfortable and in no apparent distress, well nourished  Head: Normocephalic, atraumatic  Neck: trachea midline  Respiratory: respirations unlabored, no wheezes, and no use of accessory muscles  Cardiovascular: at rest no dyspnea, well perfused  Skin: no visible rashes or lesions  Neurologic: grossly intact, oriented to person, place, and time  Psychiatric: mood and affect appropriate  Musculoskeletal: in chair for appt. no difficulty w upper body movement      Assessment/Plan   Problem List Items Addressed This Visit    None  Visit Diagnoses       Frequency of urination    -  Primary    Genitourinary syndrome of menopause        Recurrent UTI              No orders of the defined types were placed in this encounter.          Plan:  Increase to Solifenacin 10 mg daily, would like to postpone pelvic exam to next visit   As son needs to get to work and would like to see me in [place] instead  Urine looks good today, using the estrogen cream and dmanose  But would like little more help w leakage by increasing solifenacin, no side effects  Follow up Noemí 6 weeks pelvic exam [place]  Lab Results   Component Value Date    GLUCOSE 98 10/26/2023    CALCIUM 9.1 10/26/2023     10/26/2023    K 4.6 10/26/2023    CO2  27 10/26/2023     10/26/2023    BUN 22 10/26/2023    CREATININE 1.12 (H) 10/26/2023

## 2024-01-03 NOTE — PATIENT INSTRUCTIONS
Increase to Solifenacin 10 mg daily, would like to postpone pelvic exam to next visit   As son needs to get to work and would like to see me in [place] instead  Urine looks good today, using the estrogen cream and dmanose  But would like little more help w leakage by increasing solifenacin, no side effects  Follow up Noemí 6 weeks pelvic exam [place]

## 2024-02-13 ENCOUNTER — OFFICE VISIT (OUTPATIENT)
Dept: UROLOGY | Facility: CLINIC | Age: 84
End: 2024-02-13
Payer: MEDICARE

## 2024-02-13 VITALS
WEIGHT: 175 LBS | HEIGHT: 64 IN | TEMPERATURE: 96.5 F | BODY MASS INDEX: 29.88 KG/M2 | HEART RATE: 96 BPM | SYSTOLIC BLOOD PRESSURE: 135 MMHG | DIASTOLIC BLOOD PRESSURE: 84 MMHG

## 2024-02-13 DIAGNOSIS — N39.0 RECURRENT UTI: ICD-10-CM

## 2024-02-13 DIAGNOSIS — R35.0 FREQUENCY OF URINATION: Primary | ICD-10-CM

## 2024-02-13 DIAGNOSIS — N95.8 GENITOURINARY SYNDROME OF MENOPAUSE: ICD-10-CM

## 2024-02-13 LAB
POC APPEARANCE, URINE: ABNORMAL
POC BILIRUBIN, URINE: NEGATIVE
POC BLOOD, URINE: NEGATIVE
POC COLOR, URINE: YELLOW
POC GLUCOSE, URINE: NEGATIVE MG/DL
POC KETONES, URINE: NEGATIVE MG/DL
POC LEUKOCYTES, URINE: ABNORMAL
POC NITRITE,URINE: NEGATIVE
POC PH, URINE: 6 PH
POC PROTEIN, URINE: NEGATIVE MG/DL
POC SPECIFIC GRAVITY, URINE: 1.02
POC UROBILINOGEN, URINE: 0.2 EU/DL

## 2024-02-13 PROCEDURE — 51798 US URINE CAPACITY MEASURE: CPT | Performed by: NURSE PRACTITIONER

## 2024-02-13 PROCEDURE — 99213 OFFICE O/P EST LOW 20 MIN: CPT | Performed by: NURSE PRACTITIONER

## 2024-02-13 PROCEDURE — 1125F AMNT PAIN NOTED PAIN PRSNT: CPT | Performed by: NURSE PRACTITIONER

## 2024-02-13 PROCEDURE — 81003 URINALYSIS AUTO W/O SCOPE: CPT | Performed by: NURSE PRACTITIONER

## 2024-02-13 PROCEDURE — 1159F MED LIST DOCD IN RCRD: CPT | Performed by: NURSE PRACTITIONER

## 2024-02-13 PROCEDURE — 3075F SYST BP GE 130 - 139MM HG: CPT | Performed by: NURSE PRACTITIONER

## 2024-02-13 PROCEDURE — 1160F RVW MEDS BY RX/DR IN RCRD: CPT | Performed by: NURSE PRACTITIONER

## 2024-02-13 PROCEDURE — 1036F TOBACCO NON-USER: CPT | Performed by: NURSE PRACTITIONER

## 2024-02-13 PROCEDURE — 3079F DIAST BP 80-89 MM HG: CPT | Performed by: NURSE PRACTITIONER

## 2024-02-13 RX ORDER — MIRABEGRON 25 MG/1
25 TABLET, FILM COATED, EXTENDED RELEASE ORAL DAILY
Qty: 42 TABLET | Refills: 0 | COMMUNITY
Start: 2024-02-13 | End: 2024-03-15 | Stop reason: WASHOUT

## 2024-02-13 RX ORDER — ERGOCALCIFEROL 1.25 MG/1
1 CAPSULE ORAL
COMMUNITY
Start: 2024-01-17 | End: 2024-07-15

## 2024-02-13 RX ORDER — MIRABEGRON 25 MG/1
25 TABLET, FILM COATED, EXTENDED RELEASE ORAL DAILY
Qty: 30 TABLET | Refills: 11 | Status: SHIPPED | OUTPATIENT
Start: 2024-02-13 | End: 2024-03-25 | Stop reason: ALTCHOICE

## 2024-02-13 RX ORDER — TORSEMIDE 20 MG/1
20 TABLET ORAL DAILY
COMMUNITY
Start: 2024-01-17 | End: 2024-07-15

## 2024-02-13 RX ORDER — MAGNESIUM 200 MG
200 TABLET ORAL DAILY
COMMUNITY

## 2024-02-13 NOTE — PATIENT INSTRUCTIONS
Continue solifenacin 10 mg daily take in morning  Add Myrbetriq 25 mg daily w samples take in evening  Monitor bp if doing ok will increase Myrbetriq to 50 mg in a month  With goal of coming off the solifenacin  Follow up virtual visit in one month  Nurse line [phone]

## 2024-02-13 NOTE — PROGRESS NOTES
"02/13/24   [id number]    OAB, UTIs, med response, pelvic exam     Subjective      HPI Yeni Almazan is a 83 y.o. female who presents for follow up OAB, UTIs, pelvic exam, postponed from previous visit by patient; trial increased solifenacin 10 mg daily; still leaking 3-4 x per day; no side effects; but also recently went back on diuretics; No UTIs, urine small leuk today, PVR 2 cc    Dmanose 2000 mg daily, estrogen vaginal cream;      12/20/23 urine culture no growth, microscopy rbc 1-2/hpf, wbc 1-5/hpf;      11/13/23 urine culture no growth, urine appeared contaminated w 51-75 epi cells; wbc and rbc maybe related to just being treated for UTI two weeks prior on 10/26/23 Ecoli + culture;      10/4/23 klebsiella + urine culture          Objective     /84   Pulse 96   Temp 35.8 °C (96.5 °F)   Ht 1.626 m (5' 4\")   Wt 79.4 kg (175 lb)   BMI 30.04 kg/m²    Physical Exam  Genitourinary:     Comments: No vulvar lesions, neg Qtip tenderness, mod atrophy  Neg CST lying down, Neg levator ani tenderness or tightness  Stage II cystocele, no rectocele or uterine prolapse  Non tender ovaries/uterus, difficult exam d/t  body habitus   No rectal exam done        General: Appears comfortable and in no apparent distress, well nourished  Head: Normocephalic, atraumatic  Neck: trachea midline  Respiratory: respirations unlabored, no wheezes, and no use of accessory muscles  Cardiovascular: at rest no dyspnea, well perfused  Skin: no visible rashes or lesions  Neurologic: grossly intact, oriented to person, place, and time  Psychiatric: mood and affect appropriate  Musculoskeletal: in chair for appt. no difficulty w upper body movement      Assessment/Plan   Problem List Items Addressed This Visit    None  Visit Diagnoses       Frequency of urination    -  Primary    Relevant Orders    Post-Void Residual (Completed)    Recurrent UTI        Relevant Orders    POCT UA Automated manually resulted (Completed)    Genitourinary " syndrome of menopause              Orders Placed This Encounter   Procedures    Post-Void Residual    POCT UA Automated manually resulted     Order Specific Question:   Release result to [facility]     Answer:   Immediate [1]      Continue solifenacin 10 mg daily take in morning  Add Myrbetriq 25 mg daily w samples take in evening  Monitor bp if doing ok will increase Myrbetriq to 50 mg in a month  With goal of coming off the solifenacin  Follow up virtual visit in one month  Nurse line [phone]       Noemí Duque, APRN-CNP  Lab Results   Component Value Date    GLUCOSE 98 10/26/2023    CALCIUM 9.1 10/26/2023     10/26/2023    K 4.6 10/26/2023    CO2 27 10/26/2023     10/26/2023    BUN 22 10/26/2023    CREATININE 1.12 (H) 10/26/2023

## 2024-03-11 ENCOUNTER — OFFICE VISIT (OUTPATIENT)
Dept: PRIMARY CARE | Facility: CLINIC | Age: 84
End: 2024-03-11
Payer: MEDICARE

## 2024-03-11 VITALS
SYSTOLIC BLOOD PRESSURE: 111 MMHG | OXYGEN SATURATION: 97 % | BODY MASS INDEX: 28.38 KG/M2 | HEIGHT: 64 IN | HEART RATE: 67 BPM | WEIGHT: 166.2 LBS | TEMPERATURE: 96.8 F | DIASTOLIC BLOOD PRESSURE: 86 MMHG

## 2024-03-11 DIAGNOSIS — Z00.00 MEDICARE ANNUAL WELLNESS VISIT, SUBSEQUENT: Primary | ICD-10-CM

## 2024-03-11 DIAGNOSIS — I48.11 LONGSTANDING PERSISTENT ATRIAL FIBRILLATION (MULTI): ICD-10-CM

## 2024-03-11 DIAGNOSIS — I10 BENIGN ESSENTIAL HYPERTENSION: ICD-10-CM

## 2024-03-11 DIAGNOSIS — E05.90 HYPERTHYROIDISM: ICD-10-CM

## 2024-03-11 DIAGNOSIS — N17.9 ACUTE KIDNEY INJURY (CMS-HCC): ICD-10-CM

## 2024-03-11 DIAGNOSIS — I50.32 CHRONIC DIASTOLIC (CONGESTIVE) HEART FAILURE (MULTI): ICD-10-CM

## 2024-03-11 DIAGNOSIS — J44.9 CHRONIC OBSTRUCTIVE PULMONARY DISEASE, UNSPECIFIED COPD TYPE (MULTI): ICD-10-CM

## 2024-03-11 DIAGNOSIS — Z13.89 SCREENING FOR MULTIPLE CONDITIONS: ICD-10-CM

## 2024-03-11 DIAGNOSIS — E53.8 VITAMIN B 12 DEFICIENCY: ICD-10-CM

## 2024-03-11 DIAGNOSIS — R73.01 IMPAIRED FASTING GLUCOSE: ICD-10-CM

## 2024-03-11 LAB
NON-UH HIE HGB A1C: 5.9 %
NON-UH HIE VITAMIN B12: 1084 PG/ML (ref 211–911)

## 2024-03-11 PROCEDURE — 1123F ACP DISCUSS/DSCN MKR DOCD: CPT | Performed by: INTERNAL MEDICINE

## 2024-03-11 PROCEDURE — 3074F SYST BP LT 130 MM HG: CPT | Performed by: INTERNAL MEDICINE

## 2024-03-11 PROCEDURE — 1158F ADVNC CARE PLAN TLK DOCD: CPT | Performed by: INTERNAL MEDICINE

## 2024-03-11 PROCEDURE — 1160F RVW MEDS BY RX/DR IN RCRD: CPT | Performed by: INTERNAL MEDICINE

## 2024-03-11 PROCEDURE — 1170F FXNL STATUS ASSESSED: CPT | Performed by: INTERNAL MEDICINE

## 2024-03-11 PROCEDURE — 1036F TOBACCO NON-USER: CPT | Performed by: INTERNAL MEDICINE

## 2024-03-11 PROCEDURE — 99214 OFFICE O/P EST MOD 30 MIN: CPT | Performed by: INTERNAL MEDICINE

## 2024-03-11 PROCEDURE — G0439 PPPS, SUBSEQ VISIT: HCPCS | Performed by: INTERNAL MEDICINE

## 2024-03-11 PROCEDURE — 1159F MED LIST DOCD IN RCRD: CPT | Performed by: INTERNAL MEDICINE

## 2024-03-11 PROCEDURE — 3079F DIAST BP 80-89 MM HG: CPT | Performed by: INTERNAL MEDICINE

## 2024-03-11 ASSESSMENT — ACTIVITIES OF DAILY LIVING (ADL)
DOING_HOUSEWORK: NEEDS ASSISTANCE
DRESSING: INDEPENDENT
BATHING: INDEPENDENT
TAKING_MEDICATION: INDEPENDENT
MANAGING_FINANCES: INDEPENDENT
GROCERY_SHOPPING: INDEPENDENT

## 2024-03-11 NOTE — PROGRESS NOTES
CHIEF COMPLAINT  Medicare Annual Wellness Visit Subsequent    HISTORY OF PRESENT ILLNESS  Yeni Almazan is a 84 y.o. female presents today for follow up of Medicare Annual Wellness Visit Subsequent    HPI    Past Medical, Surgical, and Family History reviewed and updated in chart.  Reviewed all medications by prescribing practitioner or clinical pharmacist (such as prescriptions, OTCs, herbal therapies and supplements) and documented in the medical record.    History reviewed and updated.  She has lab order from her cardiologist and nephrologist, plans to get later this week.  She gets frustrated sometimes with her diuretic - if she is going somewhere, she has to skip the pill that day.  Her cardiologist is aware.  She does not take it about twice per week.  Her urologist is adjusting her medication to try to help with urinary frequency also.    Continues to work on painting ceramics.    REVIEW OF SYSTEMS  Review of Systems   Constitutional:  Negative for chills, fatigue and fever.   HENT:  Negative for sore throat.    Respiratory:  Negative for cough and shortness of breath.    Cardiovascular:  Positive for leg swelling. Negative for chest pain and palpitations.   Gastrointestinal:  Negative for abdominal pain, constipation, diarrhea, nausea and vomiting.   Genitourinary:  Positive for frequency. Negative for dysuria and hematuria.   Musculoskeletal:  Positive for arthralgias, back pain and gait problem.   Neurological:  Negative for dizziness, light-headedness and headaches.   Psychiatric/Behavioral:  Negative for confusion.        ALLERGIES  Patient has no known allergies.    MEDICATIONS  Current Outpatient Medications   Medication Instructions    acetaminophen (TYLENOL) 325 mg, oral, 3 times daily PRN    albuterol (ProAir HFA) 90 mcg/actuation inhaler 2 puffs, inhalation, Every 6 hours PRN    alendronate (Fosamax) 70 mg tablet oral    aluminum hydrox-magnesium carb (Gaviscon)  mg/15 mL suspension oral  suspension oral, As needed    betamethasone, augmented, (Diprolene AF) 0.05 % cream Topical, 2 times daily    calcium carbonate-vitamin D3 600 mg-10 mcg (400 unit) tablet 1 tablet, oral, Daily    carvedilol (Coreg) 25 mg tablet     cyanocobalamin, vitamin B-12, 1,000 mcg tablet, sublingual 1 tablet, sublingual, Daily    ergocalciferol (Vitamin D-2) 1.25 MG (17605 UT) capsule 1 capsule, oral, Weekly    estradiol (Estrace) 0.01 % (0.1 mg/gram) vaginal cream Apply nightly for 3 weeks, then 3 times per week.    ferrous sulfate 325 (65 Fe) MG EC tablet oral    gabapentin (NEURONTIN) 300 mg, oral, 3 times daily    hydroCHLOROthiazide (HYDRODIURIL) 25 mg, oral, Daily    lisinopril 10 mg tablet     magnesium 200 mg, oral, Daily    methIMAzole (TAPAZOLE) 2.5 mg, oral, Daily    mirabegron (MYRBETRIQ) 25 mg, oral, Daily    omeprazole (PRILOSEC) 20 mg, oral, 2 times daily    rOPINIRole (REQUIP) 3 mg, oral, 2 times daily    solifenacin (VESICARE) 10 mg, oral, Daily, Swallow tablet whole; do not crush, chew, or split.    sucralfate (CARAFATE) 1 g, oral, 3 times daily    torsemide (DEMADEX) 20 mg, oral, Daily    Xarelto 20 mg tablet Take 1 tablet (20 mg) by mouth once daily in the evening. Take with meals.       TOBACCO USE  Social History     Tobacco Use   Smoking Status Never   Smokeless Tobacco Never       DEPRESSION SCREEN  Over the past 2 weeks, how often have you been bothered by any of the following problems?  Little interest or pleasure in doing things: Not at all  Feeling down, depressed, or hopeless: Not at all    SURGICAL HISTORY  Past Surgical History:  03/06/2014: BACK SURGERY      Comment:  Back Surgery  08/09/2017: CATARACT EXTRACTION      Comment:  Cataract Surgery  06/08/2017: CHOLECYSTECTOMY      Comment:  Cholecystectomy  04/17/2014: COLONOSCOPY      Comment:  Complete Colonoscopy  10/05/2016: ESOPHAGOGASTRODUODENOSCOPY      Comment:  Diagnostic Esophagogastroduodenoscopy  04/17/2014: OTHER SURGICAL HISTORY       "Comment:  Treatment Of Ankle Fracture  09/07/2022: OTHER SURGICAL HISTORY      Comment:  Esophagogastroduodenoscopy  09/13/2016: OTHER SURGICAL HISTORY      Comment:  Atrial Cardioversion       OBJECTIVE    /86   Pulse 67   Temp 36 °C (96.8 °F)   Ht 1.626 m (5' 4\")   Wt 75.4 kg (166 lb 3.2 oz)   SpO2 97%   BMI 28.53 kg/m²    BMI: Estimated body mass index is 28.53 kg/m² as calculated from the following:    Height as of this encounter: 1.626 m (5' 4\").    Weight as of this encounter: 75.4 kg (166 lb 3.2 oz).    BP Readings from Last 3 Encounters:   03/11/24 111/86   02/13/24 135/84   01/03/24 (!) 177/108      Wt Readings from Last 3 Encounters:   03/11/24 75.4 kg (166 lb 3.2 oz)   02/13/24 79.4 kg (175 lb)   01/03/24 75.8 kg (167 lb)        PHYSICAL EXAM  Physical Exam  Constitutional:       Appearance: Normal appearance. She is obese.   HENT:      Head: Normocephalic and atraumatic.   Neck:      Vascular: No carotid bruit.   Cardiovascular:      Rate and Rhythm: Normal rate and regular rhythm.      Heart sounds: No murmur heard.  Pulmonary:      Effort: Pulmonary effort is normal. No respiratory distress.      Breath sounds: Normal breath sounds. No wheezing.   Musculoskeletal:      Right lower leg: No edema.      Left lower leg: No edema.   Skin:     Findings: No rash.   Neurological:      General: No focal deficit present.      Mental Status: She is alert and oriented to person, place, and time. Mental status is at baseline.   Psychiatric:         Mood and Affect: Mood normal.         Behavior: Behavior normal.         Thought Content: Thought content normal.         Judgment: Judgment normal.          ASSESSMENT AND PLAN  Assessment/Plan   Problem List Items Addressed This Visit       Chronic obstructive pulmonary disease, unspecified COPD type (CMS/HCC)    Overview     Uses albuterol PRN         Acute kidney injury (CMS/[facility])    Overview     Managed by Dr. Ruth         Atrial fibrillation (CMS/[facility]) "    Overview     Managed by Dr. Foss         Benign essential hypertension    Hyperthyroidism    Overview     Managed by Dr. Macias         Impaired fasting glucose    Relevant Orders    Hemoglobin A1c    Vitamin B 12 deficiency    Relevant Orders    Vitamin B12    Chronic diastolic (congestive) heart failure (CMS/[facility])    Overview     Managed by Dr. Foss          Medicare annual wellness visit, subsequent - Primary    Screening for multiple conditions    Overview     Depression screening completed using the PHQ2 questions with results documented in the chart/encounter            Medicare Wellness Visit completed.     Hypertension - generally well controlled, continue current medications.     AFib - on beta blocker and Xarelto, follows with cardiologist.     Hyperthyroidism - on methimazole, follows with Dr. Macias.      COPD - uses albuterol PRN with good results.     B12 deficiency - check level, adjust replacement as necessary.      Impaired fasting glucose - check A1c.     Osteoporosis - alendronate off and on, states Dr. Macias ordered her a bone density test.     No additional mammogram, colonoscopy in basis of advanced age and comorbidities.     Reviewed signs of skin cancer and importance of sun protection.     Continue to stay current with routine dental and eye exams.     Shingrix and Prevnar 20 are recommended.  Flu shot recommended annually in the fall      Follow-up in 6 months.

## 2024-03-15 PROBLEM — M79.89 CALF SWELLING: Status: RESOLVED | Noted: 2023-10-24 | Resolved: 2024-03-15

## 2024-03-15 PROBLEM — M79.669 CALF PAIN: Status: RESOLVED | Noted: 2023-10-24 | Resolved: 2024-03-15

## 2024-03-15 PROBLEM — E05.90 THYROTOXICOSIS: Status: RESOLVED | Noted: 2023-10-24 | Resolved: 2024-03-15

## 2024-03-15 PROBLEM — Z13.89 SCREENING FOR MULTIPLE CONDITIONS: Status: ACTIVE | Noted: 2024-03-15

## 2024-03-15 ASSESSMENT — ENCOUNTER SYMPTOMS
CONFUSION: 0
FATIGUE: 0
DIZZINESS: 0
VOMITING: 0
ARTHRALGIAS: 1
DIARRHEA: 0
SORE THROAT: 0
CONSTIPATION: 0
COUGH: 0
NAUSEA: 0
DYSURIA: 0
SHORTNESS OF BREATH: 0
CHILLS: 0
FREQUENCY: 1
ABDOMINAL PAIN: 0
PALPITATIONS: 0
FEVER: 0
LIGHT-HEADEDNESS: 0
HEMATURIA: 0
HEADACHES: 0
BACK PAIN: 1

## 2024-03-25 ENCOUNTER — TELEMEDICINE (OUTPATIENT)
Dept: UROLOGY | Facility: CLINIC | Age: 84
End: 2024-03-25
Payer: MEDICARE

## 2024-03-25 DIAGNOSIS — R39.15 URGENCY OF URINATION: ICD-10-CM

## 2024-03-25 DIAGNOSIS — R35.0 FREQUENCY OF URINATION: Primary | ICD-10-CM

## 2024-03-25 DIAGNOSIS — N39.41 URGE INCONTINENCE: ICD-10-CM

## 2024-03-25 PROCEDURE — 1159F MED LIST DOCD IN RCRD: CPT | Performed by: NURSE PRACTITIONER

## 2024-03-25 PROCEDURE — 99442 PR PHYS/QHP TELEPHONE EVALUATION 11-20 MIN: CPT | Performed by: NURSE PRACTITIONER

## 2024-03-25 PROCEDURE — 1123F ACP DISCUSS/DSCN MKR DOCD: CPT | Performed by: NURSE PRACTITIONER

## 2024-03-25 PROCEDURE — 1036F TOBACCO NON-USER: CPT | Performed by: NURSE PRACTITIONER

## 2024-03-25 PROCEDURE — 1160F RVW MEDS BY RX/DR IN RCRD: CPT | Performed by: NURSE PRACTITIONER

## 2024-03-25 RX ORDER — MIRABEGRON 50 MG/1
50 TABLET, EXTENDED RELEASE ORAL DAILY
Qty: 30 TABLET | Refills: 11 | Status: SHIPPED | OUTPATIENT
Start: 2024-03-25 | End: 2025-03-25

## 2024-03-25 NOTE — PATIENT INSTRUCTIONS
Stop solifenacin  Increase Myrbetriq 50 mg   Follow up 6 weeks PVR in office  Continue Dmanose and vaginal estrogen cream  Nurse line [phone]

## 2024-03-25 NOTE — PROGRESS NOTES
03/25/24   [id number]    Patient agreed to phone visit from her home to my office in [place], [place]    Follow up overactive bladder     Subjective      HPI Yeni Almazan is a 84 y.o. female who presents for follow up overactive bladder; medication response Myrbetriq 25 mg daily, plans to come off solifenacin eventually d/t concern w long term risks of anticholinergics; 99/78; had to go back on diuretic w kidney specialist; didn't feel solifenacin working any more, will increase Myrbetriq 50 mg daily; no UTI symptoms, no hematuria;     Using the estrogen cream 3 x per week and Dmanose;     Objective     There were no vitals taken for this visit.   Physical Exam  Deferred for phone visit    Assessment/Plan   Problem List Items Addressed This Visit    None  Visit Diagnoses       Frequency of urination    -  Primary    Relevant Medications    mirabegron (Myrbetriq) 50 mg tablet extended release 24 hr 24 hr tablet    Urge incontinence        Urgency of urination              No orders of the defined types were placed in this encounter.  Stop solifenacin  Increase Myrbetriq 50 mg   Follow up 6 weeks PVR in office  Continue Dmanose and vaginal estrogen cream  Nurse line [phone]       Noemí Duque, APRN-CNP  Lab Results   Component Value Date    GLUCOSE 98 10/26/2023    CALCIUM 9.1 10/26/2023     10/26/2023    K 4.6 10/26/2023    CO2 27 10/26/2023     10/26/2023    BUN 22 10/26/2023    CREATININE 1.12 (H) 10/26/2023

## 2024-05-09 ENCOUNTER — APPOINTMENT (OUTPATIENT)
Dept: UROLOGY | Facility: CLINIC | Age: 84
End: 2024-05-09
Payer: MEDICARE

## 2024-06-26 ENCOUNTER — APPOINTMENT (OUTPATIENT)
Dept: UROLOGY | Facility: CLINIC | Age: 84
End: 2024-06-26
Payer: MEDICARE

## 2024-08-08 ENCOUNTER — TELEPHONE (OUTPATIENT)
Dept: UROLOGY | Facility: CLINIC | Age: 84
End: 2024-08-08
Payer: COMMERCIAL

## 2024-08-08 NOTE — TELEPHONE ENCOUNTER
Pt left message asking if she should refill her D-Mannose. Spoke with pt and advised her to continue with the D-Mannose, especially since it seems to be helping as she hasn't has a UTI recently. She also mentioned that the Myrbetriq is really helping but is expensive at about $100/month. Mentioned to her about getting the generic filled though [facility] for apprx $60 for 90 days and she does want to move forward with that. Please place rx for 90 days of Mirabgeron 50mg with 3 refills so I can can have Balbina print and sign tomorrow, thanks.

## 2024-08-09 DIAGNOSIS — R35.0 FREQUENCY OF URINATION: ICD-10-CM

## 2024-08-09 RX ORDER — MIRABEGRON 50 MG/1
50 TABLET, EXTENDED RELEASE ORAL DAILY
Qty: 90 TABLET | Refills: 3 | Status: SHIPPED | OUTPATIENT
Start: 2024-08-09 | End: 2025-08-09

## 2024-08-13 ENCOUNTER — TELEPHONE (OUTPATIENT)
Dept: UROLOGY | Facility: CLINIC | Age: 84
End: 2024-08-13
Payer: COMMERCIAL

## 2024-08-13 DIAGNOSIS — R35.0 FREQUENCY OF URINATION: ICD-10-CM

## 2024-08-13 RX ORDER — MIRABEGRON 50 MG/1
50 TABLET, EXTENDED RELEASE ORAL DAILY
Qty: 30 TABLET | Refills: 11 | Status: SHIPPED | OUTPATIENT
Start: 2024-08-13 | End: 2025-08-13

## 2024-08-13 NOTE — TELEPHONE ENCOUNTER
Pt left message asking why her Myrbetriq was cancelled at [facility] as she needs to pay for one more month from them because [facility] will take 3-4 weeks to get and she doesn't want to run out. Please send 30 day supply to [facility], thanks.

## 2024-09-11 ENCOUNTER — APPOINTMENT (OUTPATIENT)
Dept: PRIMARY CARE | Facility: CLINIC | Age: 84
End: 2024-09-11
Payer: COMMERCIAL

## 2024-09-11 VITALS
DIASTOLIC BLOOD PRESSURE: 87 MMHG | BODY MASS INDEX: 27.68 KG/M2 | HEART RATE: 94 BPM | TEMPERATURE: 97.2 F | HEIGHT: 64 IN | SYSTOLIC BLOOD PRESSURE: 126 MMHG | WEIGHT: 162.1 LBS | OXYGEN SATURATION: 97 %

## 2024-09-11 DIAGNOSIS — I10 BENIGN ESSENTIAL HYPERTENSION: ICD-10-CM

## 2024-09-11 DIAGNOSIS — E53.8 VITAMIN B 12 DEFICIENCY: Primary | ICD-10-CM

## 2024-09-11 DIAGNOSIS — Z23 NEED FOR VACCINATION: ICD-10-CM

## 2024-09-11 PROCEDURE — 1036F TOBACCO NON-USER: CPT | Performed by: INTERNAL MEDICINE

## 2024-09-11 PROCEDURE — 1160F RVW MEDS BY RX/DR IN RCRD: CPT | Performed by: INTERNAL MEDICINE

## 2024-09-11 PROCEDURE — 99213 OFFICE O/P EST LOW 20 MIN: CPT | Performed by: INTERNAL MEDICINE

## 2024-09-11 PROCEDURE — 90662 IIV NO PRSV INCREASED AG IM: CPT | Performed by: INTERNAL MEDICINE

## 2024-09-11 PROCEDURE — 1159F MED LIST DOCD IN RCRD: CPT | Performed by: INTERNAL MEDICINE

## 2024-09-11 PROCEDURE — G0008 ADMIN INFLUENZA VIRUS VAC: HCPCS | Performed by: INTERNAL MEDICINE

## 2024-09-11 PROCEDURE — 1123F ACP DISCUSS/DSCN MKR DOCD: CPT | Performed by: INTERNAL MEDICINE

## 2024-09-11 PROCEDURE — 3074F SYST BP LT 130 MM HG: CPT | Performed by: INTERNAL MEDICINE

## 2024-09-11 PROCEDURE — 3079F DIAST BP 80-89 MM HG: CPT | Performed by: INTERNAL MEDICINE

## 2024-09-11 RX ORDER — CALCITRIOL 0.25 UG/1
0.25 CAPSULE ORAL
COMMUNITY
Start: 2024-07-17 | End: 2025-01-13

## 2024-09-11 RX ORDER — MAGNESIUM 200 MG
1 TABLET ORAL DAILY
Qty: 90 TABLET | Refills: 3 | Status: SHIPPED | OUTPATIENT
Start: 2024-09-11 | End: 2025-09-11

## 2024-09-11 RX ORDER — ERGOCALCIFEROL 1.25 MG/1
1 CAPSULE ORAL
COMMUNITY
Start: 2024-07-12

## 2024-09-11 ASSESSMENT — ENCOUNTER SYMPTOMS
PALPITATIONS: 0
DIZZINESS: 0
FATIGUE: 0
BACK PAIN: 1
CHILLS: 0
LIGHT-HEADEDNESS: 0
SORE THROAT: 0
CONSTIPATION: 0
CONFUSION: 0
VOMITING: 0
HEADACHES: 0
COUGH: 0
DIARRHEA: 0
ARTHRALGIAS: 1
FEVER: 0
ABDOMINAL PAIN: 0
NAUSEA: 0
DYSURIA: 0
HEMATURIA: 0
FREQUENCY: 1
SHORTNESS OF BREATH: 0

## 2024-09-11 NOTE — PROGRESS NOTES
CHIEF COMPLAINT  Hypertension    HISTORY OF PRESENT ILLNESS  Yeni Almazan is a 84 y.o. female presents today for follow up of Hypertension    HPI    History reviewed and updated.  Blood pressure is well controlled.  She'd like a refill on her B12.  Follows with nephrologist, cardiologist, endocrinologist.  She continues to enjoy ceramics.    REVIEW OF SYSTEMS  Review of Systems   Constitutional:  Negative for chills, fatigue and fever.   HENT:  Negative for sore throat.    Respiratory:  Negative for cough and shortness of breath.    Cardiovascular:  Positive for leg swelling. Negative for chest pain and palpitations.   Gastrointestinal:  Negative for abdominal pain, constipation, diarrhea, nausea and vomiting.   Genitourinary:  Positive for frequency. Negative for dysuria and hematuria.   Musculoskeletal:  Positive for arthralgias, back pain and gait problem.   Neurological:  Negative for dizziness, light-headedness and headaches.   Psychiatric/Behavioral:  Negative for confusion.        ALLERGIES  Patient has no known allergies.    MEDICATIONS  Current Outpatient Medications   Medication Instructions    acetaminophen (TYLENOL) 325 mg, oral, 3 times daily PRN    albuterol (ProAir HFA) 90 mcg/actuation inhaler 2 puffs, inhalation, Every 6 hours PRN    alendronate (Fosamax) 70 mg tablet oral    aluminum hydrox-magnesium carb (Gaviscon)  mg/15 mL suspension oral suspension oral, As needed    betamethasone, augmented, (Diprolene AF) 0.05 % cream Topical, 2 times daily    calcitriol (ROCALTROL) 0.25 mcg, oral    calcium carbonate-vitamin D3 600 mg-10 mcg (400 unit) tablet 1 tablet, oral, Daily    carvedilol (Coreg) 25 mg tablet     cyanocobalamin (vitamin B-12) 1,000 mcg, sublingual, Daily    ergocalciferol (Vitamin D-2) 1.25 MG (15867 UT) capsule 1 capsule, oral, Once Weekly    estradiol (Estrace) 0.01 % (0.1 mg/gram) vaginal cream Apply nightly for 3 weeks, then 3 times per week.    ferrous sulfate 325 (65 Fe) MG  "EC tablet oral    hydroCHLOROthiazide (HYDRODIURIL) 25 mg, oral, Daily    lisinopril 10 mg tablet     magnesium 200 mg, oral, Daily    methIMAzole (TAPAZOLE) 2.5 mg, oral, Daily    mirabegron (MYRBETRIQ) 50 mg, oral, Daily    omeprazole (PRILOSEC) 20 mg, oral, 2 times daily    rOPINIRole (REQUIP) 3 mg, oral, 2 times daily    sucralfate (CARAFATE) 1 g, oral, 3 times daily    Xarelto 20 mg tablet Take 1 tablet (20 mg) by mouth once daily in the evening. Take with meals.       TOBACCO USE  Social History     Tobacco Use   Smoking Status Never   Smokeless Tobacco Never       DEPRESSION SCREEN  Over the past 2 weeks, how often have you been bothered by any of the following problems?  Little interest or pleasure in doing things: Not at all  Feeling down, depressed, or hopeless: Not at all    SURGICAL HISTORY  Past Surgical History:  03/06/2014: BACK SURGERY      Comment:  Back Surgery  08/09/2017: CATARACT EXTRACTION      Comment:  Cataract Surgery  06/08/2017: CHOLECYSTECTOMY      Comment:  Cholecystectomy  04/17/2014: COLONOSCOPY      Comment:  Complete Colonoscopy  10/05/2016: ESOPHAGOGASTRODUODENOSCOPY      Comment:  Diagnostic Esophagogastroduodenoscopy  04/17/2014: OTHER SURGICAL HISTORY      Comment:  Treatment Of Ankle Fracture  09/07/2022: OTHER SURGICAL HISTORY      Comment:  Esophagogastroduodenoscopy  09/13/2016: OTHER SURGICAL HISTORY      Comment:  Atrial Cardioversion       OBJECTIVE    /87   Pulse 94   Temp 36.2 °C (97.2 °F)   Ht 1.626 m (5' 4\")   Wt 73.5 kg (162 lb 1.6 oz)   SpO2 97%   BMI 27.82 kg/m²    BMI: Estimated body mass index is 27.82 kg/m² as calculated from the following:    Height as of this encounter: 1.626 m (5' 4\").    Weight as of this encounter: 73.5 kg (162 lb 1.6 oz).    BP Readings from Last 3 Encounters:   09/11/24 126/87   03/11/24 111/86   02/13/24 135/84      Wt Readings from Last 3 Encounters:   09/11/24 73.5 kg (162 lb 1.6 oz)   03/11/24 75.4 kg (166 lb 3.2 oz) "   02/13/24 79.4 kg (175 lb)        PHYSICAL EXAM  Physical Exam  Constitutional:       Appearance: Normal appearance.   HENT:      Head: Normocephalic and atraumatic.   Cardiovascular:      Rate and Rhythm: Normal rate and regular rhythm.      Heart sounds: No murmur heard.  Pulmonary:      Effort: Pulmonary effort is normal. No respiratory distress.      Breath sounds: Normal breath sounds. No wheezing.   Musculoskeletal:      Right lower leg: Edema present.      Left lower leg: Edema present.   Skin:     Findings: No rash.   Neurological:      General: No focal deficit present.      Mental Status: She is alert and oriented to person, place, and time. Mental status is at baseline.   Psychiatric:         Mood and Affect: Mood normal.         Behavior: Behavior normal.         Thought Content: Thought content normal.         Judgment: Judgment normal.          ASSESSMENT AND PLAN  Assessment/Plan   Problem List Items Addressed This Visit       Benign essential hypertension    Vitamin B 12 deficiency - Primary    Relevant Medications    cyanocobalamin, vitamin B-12, 1,000 mcg tablet, sublingual     Other Visit Diagnoses       Need for vaccination        Relevant Orders    Flu vaccine, trivalent, preservative free, HIGH-DOSE, age 65y+ (Fluzone) (Completed)            Hypertension - generally well controlled, continue current medications.      AFib - on beta blocker and Xarelto, follows with cardiologist.     Hyperthyroidism - on methimazole, follows with Dr. Macias.     CKD with secondary hyperparathyroidis - follows with nephrologist.   - Advised by Dr. Ruth to follow low potassium and low purine diet.  She is comfortable with his and declines referral to dietician at this time.      COPD - uses albuterol PRN with good results.    B12 deficiency -  continue supplement, Rx sent.     Shingrix and Prevnar 20 are recommended.    High dose flu shot today.     Follow-up in 6 months.

## 2024-09-30 ENCOUNTER — APPOINTMENT (OUTPATIENT)
Dept: UROLOGY | Facility: CLINIC | Age: 84
End: 2024-09-30
Payer: MEDICARE

## 2024-09-30 VITALS
SYSTOLIC BLOOD PRESSURE: 92 MMHG | DIASTOLIC BLOOD PRESSURE: 63 MMHG | TEMPERATURE: 97 F | BODY MASS INDEX: 27.66 KG/M2 | HEIGHT: 64 IN | HEART RATE: 90 BPM | WEIGHT: 162 LBS

## 2024-09-30 DIAGNOSIS — N39.0 RECURRENT UTI: ICD-10-CM

## 2024-09-30 DIAGNOSIS — N95.8 GENITOURINARY SYNDROME OF MENOPAUSE: ICD-10-CM

## 2024-09-30 DIAGNOSIS — R35.0 FREQUENCY OF URINATION: ICD-10-CM

## 2024-09-30 DIAGNOSIS — N39.41 URGE INCONTINENCE: ICD-10-CM

## 2024-09-30 DIAGNOSIS — R39.15 URGENCY OF URINATION: Primary | ICD-10-CM

## 2024-09-30 LAB
APPEARANCE UR: CLEAR
BACTERIA #/AREA URNS AUTO: ABNORMAL /HPF
BILIRUB UR STRIP.AUTO-MCNC: NEGATIVE MG/DL
COLOR UR: ABNORMAL
GLUCOSE UR STRIP.AUTO-MCNC: NORMAL MG/DL
HYALINE CASTS #/AREA URNS AUTO: ABNORMAL /LPF
KETONES UR STRIP.AUTO-MCNC: NEGATIVE MG/DL
LEUKOCYTE ESTERASE UR QL STRIP.AUTO: ABNORMAL
MUCOUS THREADS #/AREA URNS AUTO: ABNORMAL /LPF
NITRITE UR QL STRIP.AUTO: ABNORMAL
PH UR STRIP.AUTO: 5 [PH]
POC APPEARANCE, URINE: CLEAR
POC BILIRUBIN, URINE: NEGATIVE
POC BLOOD, URINE: NEGATIVE
POC COLOR, URINE: YELLOW
POC GLUCOSE, URINE: NEGATIVE MG/DL
POC KETONES, URINE: NEGATIVE MG/DL
POC LEUKOCYTES, URINE: ABNORMAL
POC NITRITE,URINE: POSITIVE
POC PH, URINE: 5.5 PH
POC PROTEIN, URINE: NEGATIVE MG/DL
POC SPECIFIC GRAVITY, URINE: 1.01
POC UROBILINOGEN, URINE: 0.2 EU/DL
PROT UR STRIP.AUTO-MCNC: NEGATIVE MG/DL
RBC # UR STRIP.AUTO: NEGATIVE /UL
RBC #/AREA URNS AUTO: ABNORMAL /HPF
SP GR UR STRIP.AUTO: 1.01
SQUAMOUS #/AREA URNS AUTO: ABNORMAL /HPF
UROBILINOGEN UR STRIP.AUTO-MCNC: NORMAL MG/DL
WBC #/AREA URNS AUTO: ABNORMAL /HPF

## 2024-09-30 PROCEDURE — 87186 SC STD MICRODIL/AGAR DIL: CPT

## 2024-09-30 PROCEDURE — 51798 US URINE CAPACITY MEASURE: CPT | Performed by: NURSE PRACTITIONER

## 2024-09-30 PROCEDURE — 99213 OFFICE O/P EST LOW 20 MIN: CPT | Performed by: NURSE PRACTITIONER

## 2024-09-30 PROCEDURE — 81003 URINALYSIS AUTO W/O SCOPE: CPT | Performed by: NURSE PRACTITIONER

## 2024-09-30 PROCEDURE — G2211 COMPLEX E/M VISIT ADD ON: HCPCS | Performed by: NURSE PRACTITIONER

## 2024-09-30 PROCEDURE — 3078F DIAST BP <80 MM HG: CPT | Performed by: NURSE PRACTITIONER

## 2024-09-30 PROCEDURE — 87086 URINE CULTURE/COLONY COUNT: CPT

## 2024-09-30 PROCEDURE — 1123F ACP DISCUSS/DSCN MKR DOCD: CPT | Performed by: NURSE PRACTITIONER

## 2024-09-30 PROCEDURE — 3074F SYST BP LT 130 MM HG: CPT | Performed by: NURSE PRACTITIONER

## 2024-09-30 PROCEDURE — 1159F MED LIST DOCD IN RCRD: CPT | Performed by: NURSE PRACTITIONER

## 2024-09-30 PROCEDURE — 81001 URINALYSIS AUTO W/SCOPE: CPT

## 2024-09-30 NOTE — PROGRESS NOTES
"09/30/24   [id number]      Follow up overactive bladder     Subjective      HPI Yeni Almazan is a 84 y.o. 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 doing ceramics; no leakage; able to go to wedding and two homecoming parties with no problems at all, wears pad for security;     No side effects from Myrbetriq, using [facility];   Discussed options referred  assistance program  Donut hole for xarleto    Using the estrogen cream 3 x per week and Dmanose; UA + nit, small leuk today;   Sometimes feels like UTI coming on, will send culture    Creatinine 1.12/GFR 49 on 10/26/23  Hgb A1c 5.9    PVR 0 ml today;     Objective     BP 92/63   Pulse 90   Temp 36.1 °C (97 °F)   Ht 1.626 m (5' 4\")   Wt 73.5 kg (162 lb)   BMI 27.81 kg/m²    Physical Exam  General: Appears comfortable and in no apparent distress, well nourished  Head: Normocephalic, atraumatic  Neck: trachea midline  Respiratory: respirations unlabored, no wheezes, and no use of accessory muscles  Cardiovascular: at rest no dyspnea, well perfused  Skin: no visible rashes or lesions  Neurologic: grossly intact, oriented to person, place, and time  Psychiatric: mood and affect appropriate  Musculoskeletal: in chair for appt. no difficulty w upper body movement      Assessment/Plan   Problem List Items Addressed This Visit    None  Visit Diagnoses       Urgency of urination    -  Primary    Relevant Orders    POCT UA Automated manually resulted (Completed)    Post-Void Residual (Completed)    Referral to Clinical Pharmacy    Urinalysis with Reflex Culture and Microscopic    Urge incontinence        Relevant Orders    Referral to Clinical Pharmacy    Urinalysis with Reflex Culture and Microscopic    Frequency of urination        " Genitourinary syndrome of menopause        Recurrent UTI              Orders Placed This Encounter   Procedures    Post-Void Residual    Urinalysis with Reflex Culture and Microscopic     Standing Status:   Future     Number of Occurrences:   1     Standing Expiration Date:   9/30/2025     Order Specific Question:   Release result to MyChart     Answer:   Immediate [1]    Urinalysis with Reflex Culture and Microscopic     Order Specific Question:   Release result to MyChart     Answer:   Immediate [1]    Extra Urine Gray Tube     Order Specific Question:   Release result to MyChart     Answer:   Immediate [1]    Referral to Clinical Pharmacy     Your healthcare provider has placed a referral for you to meet with a Clinical Pharmacist to optimize your medication regimen and help you achieve your health goals! A member of the scheduling team will reach out to get your appointment scheduled.     Standing Status:   Future     Standing Expiration Date:   9/30/2025     Referral Priority:   Routine     Referral Type:   Consultation     Referral Reason:   Specialty Services Required     Requested Specialty:   Pharmacy     Number of Visits Requested:   1    POCT UA Automated manually resulted     Order Specific Question:   Release result to [facility]     Answer:   Immediate [1]     Continue Myrbetriq, getting thru [facility] now  Referral  assistance program Hannah Sapola, donut hole xarelto    Continue Dmanose and vaginal estrogen cream  Follow up 6 mos  Nurse line [phone]       Noemí Duque, APRN-CNP  Lab Results   Component Value Date    GLUCOSE 98 10/26/2023    CALCIUM 9.1 10/26/2023     10/26/2023    K 4.6 10/26/2023    CO2 27 10/26/2023     10/26/2023    BUN 22 10/26/2023    CREATININE 1.12 (H) 10/26/2023

## 2024-09-30 NOTE — PATIENT INSTRUCTIONS
Continue Myrbetriq, getting thru [facility] now  Referral  assistance program mary Nugent  Follow up 6 mos  Nurse line [phone]

## 2024-10-01 LAB — HOLD SPECIMEN: NORMAL

## 2024-10-03 ENCOUNTER — TELEPHONE (OUTPATIENT)
Dept: UROLOGY | Facility: CLINIC | Age: 84
End: 2024-10-03
Payer: COMMERCIAL

## 2024-10-03 DIAGNOSIS — N30.00 ACUTE CYSTITIS WITHOUT HEMATURIA: Primary | ICD-10-CM

## 2024-10-03 RX ORDER — NITROFURANTOIN 25; 75 MG/1; MG/1
100 CAPSULE ORAL 2 TIMES DAILY
Qty: 14 CAPSULE | Refills: 0 | Status: SHIPPED | OUTPATIENT
Start: 2024-10-03 | End: 2024-10-10

## 2024-10-04 LAB — BACTERIA UR CULT: ABNORMAL

## 2024-10-04 NOTE — TELEPHONE ENCOUNTER
----- Message from Noemí Duque sent at 10/4/2024  9:08 AM EDT -----  Finish macrobid sent in  ----- Message -----  From: Roxanna Aviles MA  Sent: 9/30/2024   1:29 PM EDT  To: STAN Jean-CNP

## 2024-11-22 ENCOUNTER — APPOINTMENT (OUTPATIENT)
Dept: PHARMACY | Facility: HOSPITAL | Age: 84
End: 2024-11-22
Payer: COMMERCIAL

## 2024-11-22 DIAGNOSIS — N39.41 URGE INCONTINENCE: ICD-10-CM

## 2024-11-22 DIAGNOSIS — R35.0 FREQUENCY OF URINATION: ICD-10-CM

## 2024-11-22 DIAGNOSIS — R39.15 URGENCY OF URINATION: ICD-10-CM

## 2024-11-22 RX ORDER — MIRABEGRON 50 MG/1
50 TABLET, FILM COATED, EXTENDED RELEASE ORAL DAILY
Qty: 30 TABLET | Refills: 11 | Status: SHIPPED | OUTPATIENT
Start: 2024-11-22

## 2024-11-22 NOTE — PROGRESS NOTES
"  Patient ID: Yeni Almazan is a 84 y.o. female who presents for Urgency of urination .    Referring Provider: Noemí Duque   Pt was referred for cost assistance     Preferred Pharmacy:    [id number] [address]  [address] [facility] [address]  Phone: [phone] Fax: [phone]    [facility] - [place], [place] - [address]  [address]  [facility] [address]  [facility] [address]  Phone: [phone] Fax: [phone]      Copay assistance:   Do you have copays on your medications? Yes  Do you have trouble affording your current medications? Yes     Patient Assistance Screening (VAF)    Patient verbally reports monthly or yearly income which is less than 400% federal poverty level   Application for program has been submitted for the following medications:   Myrbetriq   Patient has been informed that program team will be reaching out to them to discuss necessary documentation, instructed to answer phone/return voicemail.   Patient aware this process may take up to 6 weeks.   If approved medication must be filled through [facility] pharmacy and may be picked up or mailed to patient.       Subjective      Vaginal Symptoms  Vaginal Dryness: No  Dysuria (pain, burning, stinging, or itching with urination): No  Vaginal and/or vulvar irritation/itching:  No  Recurrent urinary tract infections: Yes, about a month ago     Urinary Symptoms    Any history of:   Narrow-angle glaucoma? No  Impaired gastric emptying? No  Urinary retention? Yes - \"tilts body forward and is okay\"    If diabetes, blood sugar controlled? N/A  Frequently of bowel movement? Daily   Tobacco use? No  How many protective undergarments are you utilizing daily? Yes, pads daily     What medications have been tried/stopped?   Solideacin - listed in chart, patient doesn't recall   Current medication? Myrbetriq 50 mg daily   When started? February 2024  Side effects? No   Improvement in symptoms? " "Yes      Cardiovascular Health  The ASCVD Risk score (Zackery IRAHETA, et al., 2019) failed to calculate for the following reasons:    The 2019 ASCVD risk score is only valid for ages 40 to 79    Risk score cannot be calculated because patient has a medical history suggesting prior/existing ASCVD    Lab Results   Component Value Date    CHOL 129 03/24/2023     Lab Results   Component Value Date    HDL 49.6 03/24/2023     No results found for: \"LDLCALC\"  Lab Results   Component Value Date    TRIG 78 03/24/2023     No components found for: \"CHOLHDL\"        Blood Sugar Balance  Lab Results   Component Value Date    GLUCOSE 98 10/26/2023    HGBA1C 5.9 (A) 03/24/2023    HGBA1C 6.0 (A) 03/23/2022    HGBA1C 6.2 09/15/2020     No results found for: \"LEPTIN\", \"INSULFAST\", \"GLUF\"      Thyroid  Lab Results   Component Value Date    TSH 2.08 11/08/2017    G8AJWGO 6.2 11/08/2017    J4NUDMA 79 11/08/2017       Iron Status  Lab Results   Component Value Date    IRON 58 03/24/2023    TIBC 372 03/24/2023    FERRITIN 77 03/24/2023        Kidney Function  Lab Results   Component Value Date    GFRF 37 (A) 03/24/2023    CREATININE 1.12 (H) 10/26/2023       Potassium  Lab Results   Component Value Date    K 4.6 10/26/2023        Vitamin D3  Lab Results   Component Value Date    VITD25 26 (A) 03/23/2022       Current Outpatient Medications on File Prior to Visit   Medication Sig Dispense Refill    acetaminophen (TylenoL) 325 mg capsule Take 1 capsule (325 mg) by mouth 3 times a day as needed.      albuterol (ProAir HFA) 90 mcg/actuation inhaler Inhale 2 puffs every 6 hours if needed for wheezing. 18 g 3    alendronate (Fosamax) 70 mg tablet Take 1 tablet (70 mg) by mouth every 7 days.      aluminum hydrox-magnesium carb (Gaviscon)  mg/15 mL suspension oral suspension Take by mouth if needed.      betamethasone, augmented, (Diprolene AF) 0.05 % cream Apply topically 2 times a day.      calcitriol (Rocaltrol) 0.25 mcg capsule Take 1 capsule " (0.25 mcg) by mouth. (Patient taking differently: Take 1 capsule (0.25 mcg) by mouth. MW)      carvedilol (Coreg) 25 mg tablet Take 1 tablet (25 mg) by mouth 2 times a day.      cyanocobalamin, vitamin B-12, 1,000 mcg tablet, sublingual Place 1 tablet (1,000 mcg) under the tongue once daily. 90 tablet 3    ergocalciferol (Vitamin D-2) 1.25 MG (60256 UT) capsule Take 1 capsule (1,250 mcg) by mouth 1 (one) time per week. (Patient taking differently: Take 1 capsule (1,250 mcg) by mouth 1 (one) time per week. 1 capsule twice a month)      methIMAzole (Tapazole) 5 mg tablet Take 0.5 tablets (2.5 mg) by mouth once daily.      mirabegron (Myrbetriq) 50 mg tablet extended release 24 hr 24 hr tablet Take 1 tablet (50 mg) by mouth once daily. 30 tablet 11    omeprazole (PriLOSEC) 20 mg DR capsule Take 1 capsule (20 mg) by mouth 2 times a day.      rOPINIRole (Requip) 3 mg tablet Take 1 tablet (3 mg) by mouth 2 times a day. 60 tablet 11    sucralfate (Carafate) 1 gram tablet Take 1 tablet (1 g) by mouth 3 times a day.      Xarelto 20 mg tablet Take 1 tablet (20 mg) by mouth once daily in the evening. Take with meals.      calcium carbonate-vitamin D3 600 mg-10 mcg (400 unit) tablet Take 1 tablet by mouth once daily.      estradiol (Estrace) 0.01 % (0.1 mg/gram) vaginal cream Apply nightly for 3 weeks, then 3 times per week. 42.5 g 5    ferrous sulfate 325 (65 Fe) MG EC tablet Take 1 tablet by mouth. Every other day      hydroCHLOROthiazide (HYDRODiuril) 25 mg tablet Take 1 tablet (25 mg) by mouth once daily. 90 tablet 3    lisinopril 10 mg tablet Take 1 tablet (10 mg) by mouth once daily.      magnesium 200 mg tablet Take 1 tablet (200 mg) by mouth. Every other day       No current facility-administered medications on file prior to visit.        Medication and allergy reconciliation completed     Drug Interactions   No significant drug interactions identified    Assessment/Plan     Patient is experiencing urinary urgency.  Since starting Myrbetriq, patient reports her urinary urgency has improved. Will apply Myrbetriq to PAP to continue improvement/reduction of urinary urgency.    Has tried before solifenacin per chart review   Patient plans to apply for  VAF, pending [phone] via email     Myrbetriq  Discussed MOA: activates beta-3 adrenergic receptors in the bladder resulting in relaxation of the detrusor smooth muscle during the urine storage phase, thus increasing bladder capacity.  Provided education on administration (swallow whole with water; do not chew, divide, or crush) and potential side effects including but not limited to headache, nose or throat irritation, dry mouth, and constipation. Any signs or symptoms of angioedema- immediately discontinue use and seek immediate medical attention.   Monitor blood pressure and heart rate. May notice a slight increase. Please call me if blood pressure becomes >120/80 mmHg or pulse significantly elevates from baseline.   BP Readings from Last 3 Encounters:   09/30/24 92/63   09/11/24 126/87   03/11/24 111/86     Caution with other meds that prolong QT interval- many drug:drug interactions   Patient is in agreement that they will notify me if starting any new medications  Efficacy is seen within 8 weeks; steady state achieved within 7 days.      LABS  Lab Results   Component Value Date    GFRF 37 (A) 03/24/2023     GFR 49 ml/min as of 10/26/2023      CONTINUE  Myrbetriq 50 mg once daily     Follow-up: 12/20/2024 at 11:20 am      Time spent with pt: Total length of time 40 (minutes) of the encounter and more than 50% was spent counseling the patient.    Mercedes Connor, PharmD     Patient Assistance Program Approval:     We are pleased to inform you that your application for assistance has been approved.     This approval is valid through  12/3/2025  as long as the following criteria continue to be satisfied:     Your medication (Myrbetriq) remains covered under your current insurance  plan.   Your prescriber does not discontinue therapy.   You do not seek reimbursement from any other private or government-funded programs for the  medication.    Under this program, the pharmacy will first bill your insurance plan for your indemnified specified medication. The [employer] will then offset your copay balance, so that your out-of pocket expense for your specialty medication will be $0.00.    Mercedes Connor PharmD     Continue all meds under the continuation of care with the referring provider and clinical pharmacy team.    Verbal consent to manage patient's drug therapy was obtained from the patient and/or an individual authorized to act on behalf of a patient. They were informed they may decline to participate or withdraw from participation in pharmacy services at any time.

## 2024-12-03 PROCEDURE — RXMED WILLOW AMBULATORY MEDICATION CHARGE

## 2024-12-05 ENCOUNTER — PHARMACY VISIT (OUTPATIENT)
Dept: PHARMACY | Facility: CLINIC | Age: 84
End: 2024-12-05
Payer: COMMERCIAL

## 2024-12-13 DIAGNOSIS — N95.8 GENITOURINARY SYNDROME OF MENOPAUSE: ICD-10-CM

## 2024-12-18 RX ORDER — ESTRADIOL 0.1 MG/G
CREAM VAGINAL
Qty: 42.5 G | Refills: 0 | Status: SHIPPED | OUTPATIENT
Start: 2024-12-18

## 2024-12-19 ENCOUNTER — TELEPHONE (OUTPATIENT)
Dept: PRIMARY CARE | Facility: CLINIC | Age: 84
End: 2024-12-19
Payer: COMMERCIAL

## 2024-12-19 DIAGNOSIS — G25.81 RESTLESS LEGS SYNDROME: ICD-10-CM

## 2024-12-19 RX ORDER — ROPINIROLE 3 MG/1
3 TABLET, FILM COATED ORAL 2 TIMES DAILY
Qty: 60 TABLET | Refills: 11 | Status: SHIPPED | OUTPATIENT
Start: 2024-12-19

## 2024-12-19 NOTE — TELEPHONE ENCOUNTER
RX Refill- Ropinirole 3 mg tab      [id number] [place], [place] - [address]  [address] [facility] [address]  Phone: [phone] Fax: [phone]

## 2024-12-20 ENCOUNTER — APPOINTMENT (OUTPATIENT)
Dept: PHARMACY | Facility: HOSPITAL | Age: 84
End: 2024-12-20
Payer: COMMERCIAL

## 2024-12-20 DIAGNOSIS — R39.15 URGENCY OF URINATION: ICD-10-CM

## 2024-12-20 DIAGNOSIS — R35.0 FREQUENCY OF URINATION: ICD-10-CM

## 2024-12-20 DIAGNOSIS — N39.41 URGE INCONTINENCE: ICD-10-CM

## 2024-12-20 NOTE — PROGRESS NOTES
"  Patient ID: Yeni Almazan is a 84 y.o. female who presents for Urgency of urination.    Referring Provider: Noemí Duque   Pt was referred for cost assistance     Preferred Pharmacy:    [id number] [address]  [address] [facility] [address]  Phone: [phone] Fax: [phone]    [facility] - [place], [place] - [address]  [address]  [address]  [facility] [address]  Phone: [phone] Fax: [phone]    [facility]  [address], [address]  [facility] [address]  Phone: [phone] Fax: [phone]      Copay assistance:   Do you have copays on your medications? Yes  Do you have trouble affording your current medications? Yes     Patient Assistance Screening (VAF)    Patient verbally reports monthly or yearly income which is less than 400% federal poverty level   Application for program has been submitted for the following medications:   Myrbetriq   Patient has been informed that program team will be reaching out to them to discuss necessary documentation, instructed to answer phone/return voicemail.   Patient aware this process may take up to 6 weeks.   If approved medication must be filled through [facility] pharmacy and may be picked up or mailed to patient.       Subjective      Vaginal Symptoms  Vaginal Dryness: No  Dysuria (pain, burning, stinging, or itching with urination): No  Vaginal and/or vulvar irritation/itching:  No  Recurrent urinary tract infections: Yes, about a month ago     Urinary Symptoms    Any history of:   Narrow-angle glaucoma? No  Impaired gastric emptying? No  Urinary retention? Yes - \"tilts body forward and is okay\"    If diabetes, blood sugar controlled? N/A  Frequently of bowel movement? Daily   Tobacco use? No  How many protective undergarments are you utilizing daily? Yes, pads daily     What medications have been tried/stopped?   Solideacin - listed in chart, patient doesn't recall " "  Current medication? Myrbetriq 50 mg daily   When started? February 2024  Side effects? No   Improvement in symptoms? Yes      Cardiovascular Health  The ASCVD Risk score (Zackery IRAHETA, et al., 2019) failed to calculate for the following reasons:    The 2019 ASCVD risk score is only valid for ages 40 to 79    Risk score cannot be calculated because patient has a medical history suggesting prior/existing ASCVD    Lab Results   Component Value Date    CHOL 129 03/24/2023     Lab Results   Component Value Date    HDL 49.6 03/24/2023     No results found for: \"LDLCALC\"  Lab Results   Component Value Date    TRIG 78 03/24/2023     No components found for: \"CHOLHDL\"        Blood Sugar Balance  Lab Results   Component Value Date    GLUCOSE 98 10/26/2023    HGBA1C 5.9 (A) 03/24/2023    HGBA1C 6.0 (A) 03/23/2022    HGBA1C 6.2 09/15/2020     No results found for: \"LEPTIN\", \"INSULFAST\", \"GLUF\"      Thyroid  Lab Results   Component Value Date    TSH 2.08 11/08/2017    Y1KLJWB 6.2 11/08/2017    Q7OWUMQ 79 11/08/2017       Iron Status  Lab Results   Component Value Date    IRON 58 03/24/2023    TIBC 372 03/24/2023    FERRITIN 77 03/24/2023        Kidney Function  Lab Results   Component Value Date    GFRF 37 (A) 03/24/2023    CREATININE 1.12 (H) 10/26/2023       Potassium  Lab Results   Component Value Date    K 4.6 10/26/2023        Vitamin D3  Lab Results   Component Value Date    VITD25 26 (A) 03/23/2022       Current Outpatient Medications on File Prior to Visit   Medication Sig Dispense Refill    acetaminophen (TylenoL) 325 mg capsule Take 1 capsule (325 mg) by mouth 3 times a day as needed.      albuterol (ProAir HFA) 90 mcg/actuation inhaler Inhale 2 puffs every 6 hours if needed for wheezing. 18 g 3    alendronate (Fosamax) 70 mg tablet Take 1 tablet (70 mg) by mouth every 7 days.      aluminum hydrox-magnesium carb (Gaviscon)  mg/15 mL suspension oral suspension Take by mouth if needed.      betamethasone, augmented, " (Diprolene AF) 0.05 % cream Apply topically 2 times a day.      calcitriol (Rocaltrol) 0.25 mcg capsule Take 1 capsule (0.25 mcg) by mouth. (Patient taking differently: Take 1 capsule (0.25 mcg) by mouth. MW)      calcium carbonate-vitamin D3 600 mg-10 mcg (400 unit) tablet Take 1 tablet by mouth once daily.      carvedilol (Coreg) 25 mg tablet Take 1 tablet (25 mg) by mouth 2 times a day.      cyanocobalamin, vitamin B-12, 1,000 mcg tablet, sublingual Place 1 tablet (1,000 mcg) under the tongue once daily. 90 tablet 3    ergocalciferol (Vitamin D-2) 1.25 MG (32838 UT) capsule Take 1 capsule (1,250 mcg) by mouth 1 (one) time per week. (Patient taking differently: Take 1 capsule (1,250 mcg) by mouth 1 (one) time per week. 1 capsule twice a month)      estradiol (Estrace) 0.01 % (0.1 mg/gram) vaginal cream APPLY NIGHTLY FOR 3 WEEKS, THEN APPLY 3 TIMES PER WEEK THEREAFTER. 42.5 g 0    ferrous sulfate 325 (65 Fe) MG EC tablet Take 1 tablet by mouth. Every other day      hydroCHLOROthiazide (HYDRODiuril) 25 mg tablet Take 1 tablet (25 mg) by mouth once daily. 90 tablet 3    lisinopril 10 mg tablet Take 1 tablet (10 mg) by mouth once daily.      magnesium 200 mg tablet Take 1 tablet (200 mg) by mouth. Every other day      methIMAzole (Tapazole) 5 mg tablet Take 0.5 tablets (2.5 mg) by mouth once daily.      mirabegron (Myrbetriq) 50 mg tablet extended release 24 hr 24 hr tablet Take 1 tablet (50 mg) by mouth once daily. 30 tablet 11    omeprazole (PriLOSEC) 20 mg DR capsule Take 1 capsule (20 mg) by mouth 2 times a day.      rOPINIRole (Requip) 3 mg tablet Take 1 tablet (3 mg) by mouth 2 times a day. 60 tablet 11    sucralfate (Carafate) 1 gram tablet Take 1 tablet (1 g) by mouth 3 times a day.      Xarelto 20 mg tablet Take 1 tablet (20 mg) by mouth once daily in the evening. Take with meals.      [DISCONTINUED] estradiol (Estrace) 0.01 % (0.1 mg/gram) vaginal cream Apply nightly for 3 weeks, then 3 times per week. 42.5  g 5    [DISCONTINUED] rOPINIRole (Requip) 3 mg tablet Take 1 tablet (3 mg) by mouth 2 times a day. 60 tablet 11     No current facility-administered medications on file prior to visit.        Medication and allergy reconciliation completed     Drug Interactions   No significant drug interactions identified    Assessment/Plan     Patient is experiencing urinary urgency. Since starting Myrbetriq, patient reports her urinary urgency has improved. Will apply Myrbetriq to PAP to continue improvement/reduction of urinary urgency.    Has tried before solifenacin per chart review   Patient approved for [facility] through 12/3/2025    Myrbetriq  Discussed MOA: activates beta-3 adrenergic receptors in the bladder resulting in relaxation of the detrusor smooth muscle during the urine storage phase, thus increasing bladder capacity.  Provided education on administration (swallow whole with water; do not chew, divide, or crush) and potential side effects including but not limited to headache, nose or throat irritation, dry mouth, and constipation. Any signs or symptoms of angioedema- immediately discontinue use and seek immediate medical attention.   Monitor blood pressure and heart rate. May notice a slight increase. Please call me if blood pressure becomes >120/80 mmHg or pulse significantly elevates from baseline.   BP Readings from Last 3 Encounters:   09/30/24 92/63   09/11/24 126/87   03/11/24 111/86     Caution with other meds that prolong QT interval- many drug:drug interactions   Patient is in agreement that they will notify me if starting any new medications  Efficacy is seen within 8 weeks; steady state achieved within 7 days.      LABS  Lab Results   Component Value Date    GFRF 37 (A) 03/24/2023     GFR 49 ml/min as of 10/26/2023      CONTINUE  Myrbetriq 50 mg once daily     Follow-up: 6/20/25 at 11 am      Time spent with pt: Total length of time 15 (minutes) of the encounter and more than 50% was spent counseling the  patient.    Mercedes Connor PharmD     Patient Assistance Program Approval:     We are pleased to inform you that your application for assistance has been approved.     This approval is valid through  12/3/2025  as long as the following criteria continue to be satisfied:     Your medication (Myrbetriq) remains covered under your current insurance plan.   Your prescriber does not discontinue therapy.   You do not seek reimbursement from any other private or government-funded programs for the  medication.    Under this program, the pharmacy will first bill your insurance plan for your indemnified specified medication. The [employer] will then offset your copay balance, so that your out-of pocket expense for your specialty medication will be $0.00.    Mercedes Connor PharmD     Continue all meds under the continuation of care with the referring provider and clinical pharmacy team.    Verbal consent to manage patient's drug therapy was obtained from the patient and/or an individual authorized to act on behalf of a patient. They were informed they may decline to participate or withdraw from participation in pharmacy services at any time.

## 2025-01-13 LAB
NON-UH HIE ALB: 3.2 G/DL (ref 3.4–5)
NON-UH HIE APPEARANCE, U: ABNORMAL
NON-UH HIE BACTERIA, U: ABNORMAL
NON-UH HIE BASO COUNT: 0.04 X1000 (ref 0–0.2)
NON-UH HIE BASOS %: 0.8 %
NON-UH HIE BILIRUBIN, U: NEGATIVE
NON-UH HIE BLOOD, U: NEGATIVE
NON-UH HIE BUN/CREAT RATIO: 23.8
NON-UH HIE BUN: 38 MG/DL (ref 9–23)
NON-UH HIE CALCIUM: 9.5 MG/DL (ref 8.7–10.4)
NON-UH HIE CALCULATED OSMOLALITY: 290 MOSM/KG (ref 275–295)
NON-UH HIE CHLORIDE: 106 MMOL/L (ref 98–107)
NON-UH HIE CO2, VENOUS: 31 MMOL/L (ref 20–31)
NON-UH HIE COLOR, U: ABNORMAL
NON-UH HIE CORRECTED CALCIUM: 10.1 MG/DL (ref 8.5–10.5)
NON-UH HIE CREATININE, URINE MG/DL: 73 MG/DL
NON-UH HIE CREATININE: 1.6 MG/DL (ref 0.5–0.8)
NON-UH HIE DIFF?: NO
NON-UH HIE EOS COUNT: 0.14 X1000 (ref 0–0.5)
NON-UH HIE EOSIN %: 2.7 %
NON-UH HIE GFR AA: 37
NON-UH HIE GLOMERULAR FILTRATION RATE: 31 ML/MIN/1.73M?
NON-UH HIE GLUCOSE QUAL, U: NEGATIVE
NON-UH HIE GLUCOSE: 98 MG/DL (ref 74–106)
NON-UH HIE HCT: 38.2 % (ref 36–46)
NON-UH HIE HGB: 12.7 G/DL (ref 12–16)
NON-UH HIE HYALINE CAST: <1 #/HPF
NON-UH HIE I-PTH: 104 PG/ML (ref 18.4–80.1)
NON-UH HIE INSTR WBC: 5.3
NON-UH HIE K: 4.6 MMOL/L (ref 3.5–5.1)
NON-UH HIE KETONES, U: NEGATIVE
NON-UH HIE LEUKOCYTE ESTERASE, U: ABNORMAL
NON-UH HIE LYMPH %: 16.8 %
NON-UH HIE LYMPH COUNT: 0.88 X1000 (ref 1.2–4.8)
NON-UH HIE MAGNESIUM: 1.7 MG/DL (ref 1.6–2.6)
NON-UH HIE MCH: 31 PG (ref 27–34)
NON-UH HIE MCHC: 33.3 G/DL (ref 32–37)
NON-UH HIE MCV: 93 FL (ref 80–100)
NON-UH HIE MICROALBUMIN, URINE MG/L: 4 MG/L
NON-UH HIE MICROALBUMIN/CREATININE RATIO: 6 MG MALB/GM CREAT (ref 0–30)
NON-UH HIE MONO %: 11.4 %
NON-UH HIE MONO COUNT: 0.6 X1000 (ref 0.1–1)
NON-UH HIE MPV: 8.5 FL (ref 7.4–10.4)
NON-UH HIE NA: 141 MMOL/L (ref 135–145)
NON-UH HIE NEUTROPHIL %: 68.3 %
NON-UH HIE NEUTROPHIL COUNT (ANC): 3.61 X1000 (ref 1.4–8.8)
NON-UH HIE NITRITE, U: NEGATIVE
NON-UH HIE NUCLEATED RBC: 0 /100WBC
NON-UH HIE PH, U: 5.5 (ref 4.5–8)
NON-UH HIE PHOSPHORUS: 3.4 MG/DL (ref 2–5.1)
NON-UH HIE PLATELET: 215 X10 (ref 150–450)
NON-UH HIE PROTEIN, U: NEGATIVE
NON-UH HIE RBC/HPF, U: 1 #/HPF (ref 0–3)
NON-UH HIE RBC: 4.11 X10 (ref 4.2–5.4)
NON-UH HIE RDW: 14.1 % (ref 11.5–14.5)
NON-UH HIE SPECIFIC GRAVITY, U: 1.01 (ref 1–1.03)
NON-UH HIE SQUAMOUS EPITHELIAL CELLS, U: 5 #/HPF
NON-UH HIE U MICRO: ABNORMAL
NON-UH HIE U WBC CLUMPS: PRESENT
NON-UH HIE URIC ACID: 8.1 MG/DL (ref 3.1–7.8)
NON-UH HIE UROBILINOGEN QUAL, U: ABNORMAL
NON-UH HIE VIT D 25: 86 NG/ML
NON-UH HIE WBC/HPF, U: 6 #/HPF (ref 0–5)
NON-UH HIE WBC: 5.3 X10 (ref 4.5–11)

## 2025-01-25 PROCEDURE — RXMED WILLOW AMBULATORY MEDICATION CHARGE

## 2025-01-29 ENCOUNTER — PHARMACY VISIT (OUTPATIENT)
Dept: PHARMACY | Facility: CLINIC | Age: 85
End: 2025-01-29
Payer: COMMERCIAL

## 2025-02-27 PROCEDURE — RXMED WILLOW AMBULATORY MEDICATION CHARGE

## 2025-02-28 ENCOUNTER — PHARMACY VISIT (OUTPATIENT)
Dept: PHARMACY | Facility: CLINIC | Age: 85
End: 2025-02-28
Payer: COMMERCIAL

## 2025-03-03 LAB
NON-UH HIE BUN/CREAT RATIO: 29.4
NON-UH HIE BUN: 50 MG/DL (ref 9–23)
NON-UH HIE CALCIUM: 9.1 MG/DL (ref 8.7–10.4)
NON-UH HIE CALCULATED OSMOLALITY: 291 MOSM/KG (ref 275–295)
NON-UH HIE CHLORIDE: 95 MMOL/L (ref 98–107)
NON-UH HIE CO2, VENOUS: 35 MMOL/L (ref 20–31)
NON-UH HIE CREATININE: 1.7 MG/DL (ref 0.5–0.8)
NON-UH HIE GFR AA: 35
NON-UH HIE GLOMERULAR FILTRATION RATE: 29 ML/MIN/1.73M?
NON-UH HIE GLUCOSE: 93 MG/DL (ref 74–106)
NON-UH HIE K: 3.6 MMOL/L (ref 3.5–5.1)
NON-UH HIE NA: 139 MMOL/L (ref 135–145)

## 2025-03-12 ENCOUNTER — APPOINTMENT (OUTPATIENT)
Dept: PRIMARY CARE | Facility: CLINIC | Age: 85
End: 2025-03-12
Payer: COMMERCIAL

## 2025-03-12 VITALS
BODY MASS INDEX: 27.69 KG/M2 | HEIGHT: 64 IN | DIASTOLIC BLOOD PRESSURE: 97 MMHG | OXYGEN SATURATION: 95 % | HEART RATE: 95 BPM | WEIGHT: 162.2 LBS | SYSTOLIC BLOOD PRESSURE: 126 MMHG

## 2025-03-12 DIAGNOSIS — N39.0 RECURRENT URINARY TRACT INFECTION: ICD-10-CM

## 2025-03-12 DIAGNOSIS — R26.2 DIFFICULTY IN WALKING: ICD-10-CM

## 2025-03-12 DIAGNOSIS — Z99.89 AMBULATES WITH CANE: ICD-10-CM

## 2025-03-12 DIAGNOSIS — Z13.89 SCREENING FOR MULTIPLE CONDITIONS: ICD-10-CM

## 2025-03-12 DIAGNOSIS — I50.32 CHRONIC DIASTOLIC (CONGESTIVE) HEART FAILURE: ICD-10-CM

## 2025-03-12 DIAGNOSIS — Z00.00 MEDICARE ANNUAL WELLNESS VISIT, SUBSEQUENT: Primary | ICD-10-CM

## 2025-03-12 DIAGNOSIS — I48.11 LONGSTANDING PERSISTENT ATRIAL FIBRILLATION (MULTI): ICD-10-CM

## 2025-03-12 DIAGNOSIS — I10 BENIGN ESSENTIAL HYPERTENSION: ICD-10-CM

## 2025-03-12 DIAGNOSIS — M19.90 ARTHRITIS: ICD-10-CM

## 2025-03-12 DIAGNOSIS — R73.01 IMPAIRED FASTING GLUCOSE: ICD-10-CM

## 2025-03-12 LAB
POC APPEARANCE, URINE: CLEAR
POC BILIRUBIN, URINE: NEGATIVE
POC BLOOD, URINE: NEGATIVE
POC COLOR, URINE: ABNORMAL
POC GLUCOSE, URINE: NEGATIVE MG/DL
POC KETONES, URINE: NEGATIVE MG/DL
POC LEUKOCYTES, URINE: ABNORMAL
POC NITRITE,URINE: NEGATIVE
POC PH, URINE: 6 PH
POC PROTEIN, URINE: ABNORMAL MG/DL
POC SPECIFIC GRAVITY, URINE: 1.02
POC UROBILINOGEN, URINE: 1 EU/DL

## 2025-03-12 PROCEDURE — 81003 URINALYSIS AUTO W/O SCOPE: CPT | Performed by: INTERNAL MEDICINE

## 2025-03-12 PROCEDURE — 99213 OFFICE O/P EST LOW 20 MIN: CPT | Performed by: INTERNAL MEDICINE

## 2025-03-12 PROCEDURE — 3074F SYST BP LT 130 MM HG: CPT | Performed by: INTERNAL MEDICINE

## 2025-03-12 PROCEDURE — 3080F DIAST BP >= 90 MM HG: CPT | Performed by: INTERNAL MEDICINE

## 2025-03-12 PROCEDURE — G0444 DEPRESSION SCREEN ANNUAL: HCPCS | Performed by: INTERNAL MEDICINE

## 2025-03-12 PROCEDURE — G0439 PPPS, SUBSEQ VISIT: HCPCS | Performed by: INTERNAL MEDICINE

## 2025-03-12 PROCEDURE — 1123F ACP DISCUSS/DSCN MKR DOCD: CPT | Performed by: INTERNAL MEDICINE

## 2025-03-12 PROCEDURE — 1160F RVW MEDS BY RX/DR IN RCRD: CPT | Performed by: INTERNAL MEDICINE

## 2025-03-12 PROCEDURE — 1159F MED LIST DOCD IN RCRD: CPT | Performed by: INTERNAL MEDICINE

## 2025-03-12 PROCEDURE — 1036F TOBACCO NON-USER: CPT | Performed by: INTERNAL MEDICINE

## 2025-03-12 PROCEDURE — 1170F FXNL STATUS ASSESSED: CPT | Performed by: INTERNAL MEDICINE

## 2025-03-12 RX ORDER — TORSEMIDE 20 MG/1
20 TABLET ORAL 2 TIMES DAILY
COMMUNITY

## 2025-03-12 ASSESSMENT — ENCOUNTER SYMPTOMS
DYSURIA: 1
HEADACHES: 0
DIARRHEA: 0
HEMATURIA: 0
FEVER: 0
VOMITING: 0
COUGH: 0
FREQUENCY: 1
CONFUSION: 0
ABDOMINAL PAIN: 0
CONSTIPATION: 0
BACK PAIN: 1
LIGHT-HEADEDNESS: 0
PALPITATIONS: 0
CHILLS: 0
FATIGUE: 0
DYSPHORIC MOOD: 0
DIZZINESS: 0
ARTHRALGIAS: 1
SHORTNESS OF BREATH: 0
SORE THROAT: 0
NAUSEA: 0

## 2025-03-12 ASSESSMENT — PATIENT HEALTH QUESTIONNAIRE - PHQ9
SUM OF ALL RESPONSES TO PHQ9 QUESTIONS 1 AND 2: 0
1. LITTLE INTEREST OR PLEASURE IN DOING THINGS: NOT AT ALL
2. FEELING DOWN, DEPRESSED OR HOPELESS: NOT AT ALL

## 2025-03-12 ASSESSMENT — ACTIVITIES OF DAILY LIVING (ADL)
MANAGING_FINANCES: INDEPENDENT
DRESSING: INDEPENDENT
DOING_HOUSEWORK: INDEPENDENT
BATHING: INDEPENDENT
GROCERY_SHOPPING: INDEPENDENT
TAKING_MEDICATION: INDEPENDENT

## 2025-03-12 NOTE — PROGRESS NOTES
CHIEF COMPLAINT  Medicare Annual Wellness Visit Subsequent (Pt c/o polyuria + dysuria x 3 days - req UA)    HISTORY OF PRESENT ILLNESS  Yeni Almazan is a 85 y.o. female presents today for follow up of Medicare Annual Wellness Visit Subsequent (Pt c/o polyuria + dysuria x 3 days - req UA)    HPI    Past Medical, Surgical, and Family History reviewed and updated in chart.  Reviewed all medications by prescribing practitioner or clinical pharmacist (such as prescriptions, OTCs, herbal therapies and supplements) and documented in the medical record.    Burning with urination started a few days ago.  Increase in frequency.  She has history of recurrent UTI, takes D-mannose, and sees urologist about once per year.    She currently also follows with cardiologist, nephrologist, and endocrinologist.  Had flare-up of LE edema around beginning of year, her diuretics were adjusted and she is doing much better now.      REVIEW OF SYSTEMS  Review of Systems   Constitutional:  Negative for chills, fatigue and fever.   HENT:  Negative for sore throat.    Respiratory:  Negative for cough and shortness of breath.    Cardiovascular:  Positive for leg swelling. Negative for chest pain and palpitations.   Gastrointestinal:  Negative for abdominal pain, constipation, diarrhea, nausea and vomiting.   Genitourinary:  Positive for dysuria and frequency. Negative for hematuria.   Musculoskeletal:  Positive for arthralgias, back pain and gait problem.   Neurological:  Negative for dizziness, light-headedness and headaches.   Psychiatric/Behavioral:  Negative for confusion and dysphoric mood.        ALLERGIES  Patient has no known allergies.    MEDICATIONS  Current Outpatient Medications   Medication Instructions    acetaminophen (TYLENOL) 325 mg, 3 times daily PRN    albuterol (ProAir HFA) 90 mcg/actuation inhaler 2 puffs, inhalation, Every 6 hours PRN    alendronate (FOSAMAX) 70 mg, Every 7 days    aluminum hydrox-magnesium carb (Gaviscon)   mg/15 mL suspension oral suspension As needed    betamethasone, augmented, (Diprolene AF) 0.05 % cream 2 times daily    calcitriol (ROCALTROL) 0.25 mcg    calcium carbonate-vitamin D3 600 mg-10 mcg (400 unit) tablet 1 tablet, Daily    carvedilol (Coreg) 25 mg tablet Take 1 tablet (25 mg) by mouth 2 times a day.    cyanocobalamin (vitamin B-12) 1,000 mcg, sublingual, Daily    d-mannose 500 mg capsule 1 capsule, oral, 2 times daily    ergocalciferol (Vitamin D-2) 1.25 MG (95139 UT) capsule 1 capsule, Once Weekly    estradiol (Estrace) 0.01 % (0.1 mg/gram) vaginal cream APPLY NIGHTLY FOR 3 WEEKS, THEN APPLY 3 TIMES PER WEEK THEREAFTER.    ferrous sulfate 325 mg    hydroCHLOROthiazide (HYDRODIURIL) 25 mg, oral, Daily    lisinopril 10 mg tablet Take 1 tablet (10 mg) by mouth once daily.    methIMAzole (TAPAZOLE) 2.5 mg, Daily    Myrbetriq 50 mg, oral, Daily    omeprazole (PRILOSEC) 20 mg, 2 times daily    rOPINIRole (REQUIP) 3 mg, oral, 2 times daily    sucralfate (CARAFATE) 1 g, 3 times daily    torsemide (DEMADEX) 20 mg, oral, 2 times daily    Xarelto 20 mg tablet Take 1 tablet (20 mg) by mouth once daily in the evening. Take with meals.       TOBACCO USE  Social History     Tobacco Use   Smoking Status Never   Smokeless Tobacco Never       DEPRESSION SCREEN  Over the past 2 weeks, how often have you been bothered by any of the following problems?  Little interest or pleasure in doing things: Not at all  Feeling down, depressed, or hopeless: Not at all    SURGICAL HISTORY  Past Surgical History:  03/06/2014: BACK SURGERY      Comment:  Back Surgery  08/09/2017: CATARACT EXTRACTION      Comment:  Cataract Surgery  06/08/2017: CHOLECYSTECTOMY      Comment:  Cholecystectomy  04/17/2014: COLONOSCOPY      Comment:  Complete Colonoscopy  10/05/2016: ESOPHAGOGASTRODUODENOSCOPY      Comment:  Diagnostic Esophagogastroduodenoscopy  04/17/2014: OTHER SURGICAL HISTORY      Comment:  Treatment Of Ankle Fracture  09/07/2022:  "OTHER SURGICAL HISTORY      Comment:  Esophagogastroduodenoscopy  09/13/2016: OTHER SURGICAL HISTORY      Comment:  Atrial Cardioversion       OBJECTIVE    BP (!) 126/97   Pulse 95   Ht 1.626 m (5' 4\")   Wt 73.6 kg (162 lb 3.2 oz)   SpO2 95%   BMI 27.84 kg/m²    BMI: Estimated body mass index is 27.84 kg/m² as calculated from the following:    Height as of this encounter: 1.626 m (5' 4\").    Weight as of this encounter: 73.6 kg (162 lb 3.2 oz).    BP Readings from Last 3 Encounters:   03/12/25 (!) 126/97   09/30/24 92/63   09/11/24 126/87      Wt Readings from Last 3 Encounters:   03/12/25 73.6 kg (162 lb 3.2 oz)   09/30/24 73.5 kg (162 lb)   09/11/24 73.5 kg (162 lb 1.6 oz)        PHYSICAL EXAM  Physical Exam  Constitutional:       Appearance: Normal appearance.   HENT:      Head: Normocephalic and atraumatic.      Right Ear: Tympanic membrane and ear canal normal.      Left Ear: Tympanic membrane and ear canal normal.   Neck:      Vascular: No carotid bruit.   Cardiovascular:      Rate and Rhythm: Normal rate. Rhythm irregular.      Heart sounds: No murmur heard.  Pulmonary:      Effort: Pulmonary effort is normal. No respiratory distress.      Breath sounds: Normal breath sounds. No wheezing.   Musculoskeletal:      Right lower leg: Edema present.      Left lower leg: Edema present.      Comments: Wearing compression stockings   Skin:     Findings: No rash.   Neurological:      General: No focal deficit present.      Mental Status: She is alert and oriented to person, place, and time. Mental status is at baseline.      Gait: Gait abnormal (uses cane).   Psychiatric:         Mood and Affect: Mood normal.         Behavior: Behavior normal.         Thought Content: Thought content normal.         Judgment: Judgment normal.          ASSESSMENT AND PLAN  Assessment/Plan   Problem List Items Addressed This Visit       Ambulates with cane    Relevant Orders    Disability Placard    Arthritis    Relevant Orders    " Disability Placard    Atrial fibrillation (Multi)    Overview     Managed by Dr. Foss         Benign essential hypertension    Relevant Orders    Lipid Panel    Impaired fasting glucose    Relevant Orders    Lipid Panel    Hemoglobin A1c    Recurrent urinary tract infection    Relevant Orders    POCT UA Automated manually resulted (Completed)    Chronic diastolic (congestive) heart failure    Overview     Managed by Dr. Foss          Medicare annual wellness visit, subsequent - Primary    Screening for multiple conditions    Overview     5 minutes were spent screening for depression using PHQ2/PHQ9 as documented in the chart.              Other Visit Diagnoses       Difficulty in walking        Relevant Orders    Disability Placard          Medicare Wellness Visit completed.    Dysuria, Frequency, with history of recurrent UTI - UA today with small leukocytes, no nitrites.  Continue D-mannose and adequate fluid intake.    - follow-up if symptoms worsen or fail to improve.    Arthritis, walks with cane - Rx for disability placard.     Hypertension - usually at goal, diastolic is elevated today.  - continue current medications, which are currently being managed by cardiologist and nephrologist.      AFib - on beta blocker and Xarelto, follows with cardiologist.     Hyperthyroidism - on methimazole, follows with Dr. Macias.      COPD - uses albuterol PRN with good results.     B12 deficiency - check level, adjust replacement as necessary.      Impaired fasting glucose - check A1c.     Osteoporosis - states she has order for DEXA from Dr. Macias.      No additional mammogram, colonoscopy in basis of advanced age and comorbidities.     Reviewed signs of skin cancer and importance of sun protection.     Continue to stay current with routine dental and eye exams.     Shingrix and Prevnar 20 are recommended - she declines.  Flu shot recommended annually in the fall      Follow-up in 6 months.

## 2025-03-18 ENCOUNTER — TELEPHONE (OUTPATIENT)
Dept: PRIMARY CARE | Facility: CLINIC | Age: 85
End: 2025-03-18
Payer: COMMERCIAL

## 2025-03-18 NOTE — TELEPHONE ENCOUNTER
Pt is calling to say that she was in [facility] on 03/13/25, discharged 03/14/25.  She is following up gastroenterologist, Dr. Guzman at [facility].  Pt just wanted you to know.

## 2025-03-25 ENCOUNTER — APPOINTMENT (OUTPATIENT)
Dept: UROLOGY | Facility: CLINIC | Age: 85
End: 2025-03-25
Payer: COMMERCIAL

## 2025-03-27 DIAGNOSIS — N95.8 GENITOURINARY SYNDROME OF MENOPAUSE: ICD-10-CM

## 2025-03-27 NOTE — TELEPHONE ENCOUNTER
Pt left message stating she is having flow issues. Spoke with pt who states she is constantly urinating at this point and can't leave the house because of how bad the incontinence is. Pt had a fu appt scheduled with Noemí just this past Tues that she no showed for. She claims she had no idea she had an appt. Pt rescheduled for next Thurs at 10:40 as we had an opening but she is asking if she has to wait that long to get help for the incontinence. Pt is also asking for the estradiol cream to be sent to [email or web address] pharmacy. Noemí, please advise, thanks.

## 2025-03-28 DIAGNOSIS — R35.0 INCREASED URINARY FREQUENCY: Primary | ICD-10-CM

## 2025-03-28 RX ORDER — ESTRADIOL 0.1 MG/G
CREAM VAGINAL
Qty: 42.5 G | Refills: 3 | Status: SHIPPED | OUTPATIENT
Start: 2025-03-28

## 2025-03-28 NOTE — TELEPHONE ENCOUNTER
Order placed in chart and pt informed to drop urine off at  lab. Noemí, please advise on the estradiol refill, thanks.

## 2025-03-31 PROCEDURE — RXMED WILLOW AMBULATORY MEDICATION CHARGE

## 2025-04-01 LAB
APPEARANCE UR: ABNORMAL
BACTERIA #/AREA URNS HPF: ABNORMAL /HPF
BACTERIA UR CULT: ABNORMAL
BACTERIA UR CULT: ABNORMAL
BILIRUB UR QL STRIP: NEGATIVE
COLOR UR: ABNORMAL
GLUCOSE UR QL STRIP: NEGATIVE
HGB UR QL STRIP: ABNORMAL
HYALINE CASTS #/AREA URNS LPF: ABNORMAL /LPF
KETONES UR QL STRIP: NEGATIVE
LEUKOCYTE ESTERASE UR QL STRIP: ABNORMAL
NITRITE UR QL STRIP: NEGATIVE
PH UR STRIP: 5.5 [PH] (ref 5–8)
PROT UR QL STRIP: ABNORMAL
RBC #/AREA URNS HPF: ABNORMAL /HPF
SERVICE CMNT-IMP: ABNORMAL
SP GR UR STRIP: 1.01 (ref 1–1.03)
SQUAMOUS #/AREA URNS HPF: ABNORMAL /HPF
WBC #/AREA URNS HPF: ABNORMAL /HPF

## 2025-04-02 ENCOUNTER — TELEPHONE (OUTPATIENT)
Dept: UROLOGY | Facility: CLINIC | Age: 85
End: 2025-04-02
Payer: COMMERCIAL

## 2025-04-02 ENCOUNTER — PHARMACY VISIT (OUTPATIENT)
Dept: PHARMACY | Facility: CLINIC | Age: 85
End: 2025-04-02
Payer: COMMERCIAL

## 2025-04-02 NOTE — TELEPHONE ENCOUNTER
----- Message from Noemí Duque sent at 4/1/2025  6:07 PM EDT -----  Looks contaminated, can recheck with catheter when she come in on 4/3 if symptoms or tomorrow if wants to come in sooner.  ----- Message -----  From: [email or web address], [facility] Results In  Sent: 4/1/2025   4:48 AM EDT  To: Noemí Duque, STAN-CNP

## 2025-04-03 ENCOUNTER — APPOINTMENT (OUTPATIENT)
Dept: UROLOGY | Facility: CLINIC | Age: 85
End: 2025-04-03
Payer: MEDICARE

## 2025-04-03 VITALS — TEMPERATURE: 98 F | WEIGHT: 162 LBS | BODY MASS INDEX: 27.66 KG/M2 | HEIGHT: 64 IN

## 2025-04-03 DIAGNOSIS — N39.41 URGE INCONTINENCE OF URINE: Primary | ICD-10-CM

## 2025-04-03 DIAGNOSIS — R39.15 URGENCY OF URINATION: ICD-10-CM

## 2025-04-03 DIAGNOSIS — R35.0 FREQUENCY OF URINATION: ICD-10-CM

## 2025-04-03 DIAGNOSIS — N95.8 GENITOURINARY SYNDROME OF MENOPAUSE: ICD-10-CM

## 2025-04-03 LAB
POC APPEARANCE, URINE: CLEAR
POC BILIRUBIN, URINE: NEGATIVE
POC BLOOD, URINE: ABNORMAL
POC COLOR, URINE: YELLOW
POC GLUCOSE, URINE: NEGATIVE MG/DL
POC KETONES, URINE: NEGATIVE MG/DL
POC LEUKOCYTES, URINE: NEGATIVE
POC NITRITE,URINE: NEGATIVE
POC PH, URINE: 5.5 PH
POC PROTEIN, URINE: ABNORMAL MG/DL
POC SPECIFIC GRAVITY, URINE: 1.02
POC UROBILINOGEN, URINE: 1 EU/DL

## 2025-04-03 PROCEDURE — 1123F ACP DISCUSS/DSCN MKR DOCD: CPT | Performed by: NURSE PRACTITIONER

## 2025-04-03 PROCEDURE — 1036F TOBACCO NON-USER: CPT | Performed by: NURSE PRACTITIONER

## 2025-04-03 PROCEDURE — 1159F MED LIST DOCD IN RCRD: CPT | Performed by: NURSE PRACTITIONER

## 2025-04-03 PROCEDURE — 99213 OFFICE O/P EST LOW 20 MIN: CPT | Performed by: NURSE PRACTITIONER

## 2025-04-03 PROCEDURE — 51701 INSERT BLADDER CATHETER: CPT | Performed by: NURSE PRACTITIONER

## 2025-04-03 PROCEDURE — 81003 URINALYSIS AUTO W/O SCOPE: CPT | Performed by: NURSE PRACTITIONER

## 2025-04-03 RX ORDER — DICYCLOMINE HYDROCHLORIDE 10 MG/1
CAPSULE ORAL
COMMUNITY
Start: 2025-03-14

## 2025-04-03 RX ORDER — ESTRADIOL 0.1 MG/G
CREAM VAGINAL
Qty: 42.5 G | Refills: 5 | Status: SHIPPED | OUTPATIENT
Start: 2025-04-03

## 2025-04-03 ASSESSMENT — PATIENT HEALTH QUESTIONNAIRE - PHQ9
1. LITTLE INTEREST OR PLEASURE IN DOING THINGS: NOT AT ALL
2. FEELING DOWN, DEPRESSED OR HOPELESS: NOT AT ALL
SUM OF ALL RESPONSES TO PHQ9 QUESTIONS 1 AND 2: 0

## 2025-04-03 NOTE — PROGRESS NOTES
"04/03/25   [id number]      Follow up overactive bladder, symptoms of UTI     Subjective      HPI Yeni Almazan is a 85 y.o. 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 female.    Bladder Catheterization    Date/Time: 4/3/2025 12:14 PM    Performed by: JEFF Jean  Authorized by: JEFF Jean    Procedure Details    Procedure: catheter insertion      Catheter insertion: non-indwelling (straight)      Catheter size: 14 Fr    Urine characteristics: clear    Post-Procedure Details     Outcome: patient tolerated procedure well with no complications      Additional Details      100 cc          Using clinical pharmacy [facility] assistance program    Using the estrogen cream 3 x per week but ran out in past few weeks, burning started in past few weeks, straight cath today only trace heme, neg otherwise;     Dmanose daily    Treated for Ecoli + urine culture at that time; 9/30/24; no other UTIs since then;    Urine past few days, didn't appear infected, more contamination, straight cath today neg; rbc 0-2/hpf on urine few days ago;     Creatinine 1.7/GFR 35 on 3/3/25    Straight cath 100 ml when she came in for urine testing; doesn't appear to have elevated PVR with such low volume cathed;     Recent hospitalization for chest pain; heart burn  No side effects myrbetriq, no bp issues, has been good per patient, unable to get in office today d/t issue with machine;     Objective     Temp 36.7 °C (98 °F)   Ht 1.626 m (5' 4\")   Wt 73.5 kg (162 lb)   BMI 27.81 kg/m²    Physical Exam  General: Appears comfortable and in no apparent distress, well nourished  Head: Normocephalic, atraumatic  Neck: trachea midline  Respiratory: respirations unlabored, no wheezes, and no use of accessory " muscles  Cardiovascular: at rest no dyspnea, well perfused  Skin: no visible rashes or lesions  Neurologic: grossly intact, oriented to person, place, and time  Psychiatric: mood and affect appropriate  Musculoskeletal: in chair for appt. no difficulty w upper body movement, in wheel chair, brought cane;       Assessment/Plan   Problem List Items Addressed This Visit    None  Visit Diagnoses       Urge incontinence of urine    -  Primary    Relevant Orders    POCT UA Automated manually resulted (Completed)    Urgency of urination        Frequency of urination        Genitourinary syndrome of menopause        Relevant Medications    estradiol (Estrace) 0.01 % (0.1 mg/gram) vaginal cream          Orders Placed This Encounter   Procedures    Bladder Catheterization     This order was created via procedure documentation    POCT UA Automated manually resulted     Order Specific Question:   Release result to [employer]     Answer:   Immediate [1]     Continue Myrbetriq,  assistance program [facility] Sapola, donut hole xarelto    Continue Dmanose and vaginal estrogen cream  Follow up 6 mos    Nurse line [phone]       Noemí Duque, APRN-CNP  Lab Results   Component Value Date    GLUCOSE 98 10/26/2023    CALCIUM 9.1 10/26/2023     10/26/2023    K 4.6 10/26/2023    CO2 27 10/26/2023     10/26/2023    BUN 22 10/26/2023    CREATININE 1.12 (H) 10/26/2023

## 2025-04-03 NOTE — PATIENT INSTRUCTIONS
Plan:   Continue Myrbetriq,  assistance program [facility] Sapola, donut hole xarelto    Continue Dmanose and vaginal estrogen cream  Follow up 6 mos    Nurse line [phone]

## 2025-04-10 ENCOUNTER — PATIENT OUTREACH (OUTPATIENT)
Dept: PRIMARY CARE | Facility: CLINIC | Age: 85
End: 2025-04-10
Payer: COMMERCIAL

## 2025-04-10 ENCOUNTER — TELEPHONE (OUTPATIENT)
Dept: PRIMARY CARE | Facility: CLINIC | Age: 85
End: 2025-04-10
Payer: COMMERCIAL

## 2025-04-10 RX ORDER — METOPROLOL TARTRATE 50 MG/1
75 TABLET ORAL
COMMUNITY
Start: 2025-04-09

## 2025-04-10 RX ORDER — METHYLPREDNISOLONE 4 MG/1
TABLET ORAL
COMMUNITY
Start: 2025-04-08 | End: 2025-04-14

## 2025-04-10 RX ORDER — MAGNESIUM L-LACTATE 84 MG
84 TABLET, EXTENDED RELEASE ORAL
COMMUNITY
Start: 2025-04-09 | End: 2025-04-16

## 2025-04-10 NOTE — PROGRESS NOTES
Discharge Facility:   [facility],  Discharge Diagnosis:  Atrial fibrillation with RVR, Right ankle sprain, diffuse tenosynovitis and tendinopathy, ankle effusion, chronic DJD     Admission Date:  4/6/25   Discharge Date: 4/9/25     PCP Appointment Date: TBD- message to office   Specialist Appointment Date:  4/22/25 holter monitor x 2 weeks per cardio  Hospital Encounter and Summary Linked: Yes    Unknown   See discharge assessment below for further details      Wrap Up  Wrap Up Additional Comments: Successful transition of care outreach with patient. Pt reports doing well at home since discharge. New meds reviewed. Pt denies CP and SOB. pts son to  meds today at pharm.  Pt call with any issues. reviewed wound care for ankle. pt aware to wean off boot as kristin.  Patient denies any further discharge questions/needs at this time. Pt aware of my availability for non-emergent concerns. Contact info provided to patient. (4/10/2025 12:43 PM)    Medications  Medications reviewed with patient/caregiver?: Yes (4/10/2025 12:43 PM)  Is the patient having any side effects they believe may be caused by any medication additions or changes?: No (4/10/2025 12:43 PM)  Does the patient have all medications ordered at discharge?: No (DON to  today at Pharm) (4/10/2025 12:43 PM)  Prescription Comments: New scripts given at discharge :  magnesium lactate,  lopressor,  Medrol Dosepak .. STOP Coreg (4/10/2025 12:43 PM)  Is the patient taking all medications as directed (includes completed medication regime)?: Yes (4/10/2025 12:43 PM)  Care Management Interventions: Provided patient education (4/10/2025 12:43 PM)  Medication Comments: Pt denies problems obtaining or affording medication. No medication-related issues identified (4/10/2025 12:43 PM)    Appointments  Does the patient have a primary care provider?: Yes (4/10/2025 12:43 PM)  Care Management Interventions: Educated patient on importance of making  appointment (4/10/2025 12:43 PM)  Has the patient kept scheduled appointments due by today?: Yes (4/10/2025 12:43 PM)    Self Management  What is the home health agency?: DENIES NEED (4/10/2025 12:43 PM)  What Durable Medical Equipment (DME) was ordered?: N/A (4/10/2025 12:43 PM)    Patient Teaching  Does the patient have access to their discharge instructions?: Yes (4/10/2025 12:43 PM)  Care Management Interventions: Reviewed instructions with patient (4/10/2025 12:43 PM)  What is the patient's perception of their health status since discharge?: Improving (4/10/2025 12:43 PM)  Is the patient/caregiver able to teach back the hierarchy of who to call/visit for symptoms/problems? PCP, Specialist, Home Health nurse, Urgent Care, ED, 911: Yes (4/10/2025 12:43 PM)

## 2025-04-17 ENCOUNTER — OFFICE VISIT (OUTPATIENT)
Dept: PRIMARY CARE | Facility: CLINIC | Age: 85
End: 2025-04-17
Payer: MEDICARE

## 2025-04-17 VITALS
OXYGEN SATURATION: 95 % | WEIGHT: 154 LBS | DIASTOLIC BLOOD PRESSURE: 70 MMHG | TEMPERATURE: 98.8 F | SYSTOLIC BLOOD PRESSURE: 106 MMHG | HEART RATE: 88 BPM | HEIGHT: 64 IN | BODY MASS INDEX: 26.29 KG/M2

## 2025-04-17 DIAGNOSIS — I48.11 LONGSTANDING PERSISTENT ATRIAL FIBRILLATION (MULTI): Primary | ICD-10-CM

## 2025-04-17 DIAGNOSIS — R74.8 ELEVATED LIVER ENZYMES: ICD-10-CM

## 2025-04-17 DIAGNOSIS — M19.071 ARTHRITIS OF RIGHT ANKLE: ICD-10-CM

## 2025-04-17 PROCEDURE — 1036F TOBACCO NON-USER: CPT | Performed by: INTERNAL MEDICINE

## 2025-04-17 PROCEDURE — 1158F ADVNC CARE PLAN TLK DOCD: CPT | Performed by: INTERNAL MEDICINE

## 2025-04-17 PROCEDURE — 3078F DIAST BP <80 MM HG: CPT | Performed by: INTERNAL MEDICINE

## 2025-04-17 PROCEDURE — 1160F RVW MEDS BY RX/DR IN RCRD: CPT | Performed by: INTERNAL MEDICINE

## 2025-04-17 PROCEDURE — 99495 TRANSJ CARE MGMT MOD F2F 14D: CPT | Performed by: INTERNAL MEDICINE

## 2025-04-17 PROCEDURE — 3074F SYST BP LT 130 MM HG: CPT | Performed by: INTERNAL MEDICINE

## 2025-04-17 PROCEDURE — 1159F MED LIST DOCD IN RCRD: CPT | Performed by: INTERNAL MEDICINE

## 2025-04-17 PROCEDURE — 1123F ACP DISCUSS/DSCN MKR DOCD: CPT | Performed by: INTERNAL MEDICINE

## 2025-04-17 ASSESSMENT — ENCOUNTER SYMPTOMS
HEMATURIA: 0
FREQUENCY: 0
HEADACHES: 0
LIGHT-HEADEDNESS: 0
COUGH: 0
FEVER: 0
DYSPHORIC MOOD: 0
NAUSEA: 0
FATIGUE: 0
ARTHRALGIAS: 0
CHILLS: 0
CONSTIPATION: 0
DYSURIA: 0
CONFUSION: 0
DIARRHEA: 0
SHORTNESS OF BREATH: 0
DIZZINESS: 0
NECK PAIN: 0
ABDOMINAL PAIN: 0
VOMITING: 0
SORE THROAT: 0
PALPITATIONS: 0

## 2025-04-17 NOTE — PROGRESS NOTES
"Patient: Yeni Almazan  : 1940  PCP: Cecy Avila MD  MRN: [id number]  Program: Transitional Care Management  Status: Enrolled  Effective Dates: 4/10/2025 - present  Responsible Staff: Elena Rubio RN  Social Drivers to be Addressed: Alcohol Use, Financial Resource Strain, Physical Activity, Social Connections, Stress, Transportation Needs         Yeni Almazan is a 85 y.o. female presenting today for follow-up after being discharged from the hospital 8 days ago. The main problem requiring admission was ankle pain, Afib with RVR. The discharge summary and/or Transitional Care Management documentation was reviewed. Medication reconciliation was performed as indicated via the \"Aniket as Reviewed\" timestamp.     Yeni Almazan was contacted by Transitional Care Management services two days after her discharge. This encounter and supporting documentation was reviewed.    Has been at  [facility] 3 times since last visit.  Hospital records reviewed and summarized below with additional history per patient.    3/13/25 - [facility] Chief Compliant Chest Pain  Chest pain, no relief with Gaviscon.  Son called EMS, was taken to   - had elevated LFT's and dilated bile duct  - , GPT 78  - had MRCP, results not available  - discharged home 3/14.  Scheduled to see GI for follow-up.    25 - no records available, visit for UTI      25 - SW, ankle pain  - woke up with severe right ankle pain, was in Afib with RVR on presentation  - had xray of right ankle  - LEVD negative for DVT  - orthopedics consulted, had MRI showing partial tear of anterior talofibular ligament, sprain of calcaneofibular ligament, partial tear of posterior tibialis tendon, tenosynovitis of flexor longus and flexor digitorum longus, peroneus longus and brevis tendons, osteoarthritis of hindfoot and midfoot, plantar fasciitis.  - Coreg changed to metoprolol 75 mg po BID.  Planning holter monitor for 2 weeks per cardio.  - Discharged with Medrol Dosepak, " "boot, WBAT and RICE per ortho.  - GOT and GPT were normal 4/6/25    Has appointments coming up with ortho (Dr. Jones), GI, and cardio.  States the Medrol helped a lot, has no pain now.   Also helped neck pain she was having, which she has observed to be postural (tries not to fall asleep in chair with neck flexed).    Review of Systems   Constitutional:  Negative for chills, fatigue and fever.   HENT:  Negative for sore throat.    Respiratory:  Negative for cough and shortness of breath.    Cardiovascular:  Positive for leg swelling. Negative for chest pain and palpitations.   Gastrointestinal:  Negative for abdominal pain, constipation, diarrhea, nausea and vomiting.   Genitourinary:  Negative for dysuria, frequency and hematuria.   Musculoskeletal:  Negative for arthralgias and neck pain.        Feels great after completing Medrol dosepak   Neurological:  Negative for dizziness, light-headedness and headaches.   Psychiatric/Behavioral:  Negative for confusion and dysphoric mood.        /70   Pulse 88   Temp 37.1 °C (98.8 °F)   Ht 1.626 m (5' 4\")   Wt 69.9 kg (154 lb)   SpO2 95%   BMI 26.43 kg/m²     Physical Exam  Constitutional:       Appearance: Normal appearance.   HENT:      Head: Normocephalic and atraumatic.   Cardiovascular:      Rate and Rhythm: Normal rate. Rhythm irregular.      Heart sounds: No murmur heard.  Pulmonary:      Effort: Pulmonary effort is normal. No respiratory distress.      Breath sounds: Normal breath sounds. No wheezing.   Musculoskeletal:      Right lower leg: Edema present.      Left lower leg: Edema present.      Comments: Swelling R>L   Neurological:      General: No focal deficit present.      Mental Status: She is alert and oriented to person, place, and time. Mental status is at baseline.      Gait: Gait abnormal (uses cane).   Psychiatric:         Mood and Affect: Mood normal.         Behavior: Behavior normal.         Thought Content: Thought content normal.    "      Judgment: Judgment normal.         The complexity of medical decision making for this patient's transitional care is moderate.    Assessment/Plan   Problem List Items Addressed This Visit           ICD-10-CM    Atrial fibrillation (Multi) - Primary I48.91     Other Visit Diagnoses         Codes      Arthritis of right ankle     M19.071      Elevated liver enzymes     R74.8          Afib - HR good today.  Continue current meds, to follow-up soon with cardio for Holter.    Right ankle arthritis with multiple degenerative changes, sprain - doing much better after rest, boot, & medrol.  Follow-up with Dr. Jones as needed.    Elevated liver enzymes, possible CBD issue - request records from MRCP.  Liver enzymes were back to normal on recheck.  Has follow-up scheduled with GI.    Follow-up with me as scheduled.

## 2025-04-29 ENCOUNTER — PATIENT OUTREACH (OUTPATIENT)
Dept: PRIMARY CARE | Facility: CLINIC | Age: 85
End: 2025-04-29
Payer: COMMERCIAL

## 2025-04-29 NOTE — PROGRESS NOTES
Confirmation of at least 2 patient identifiers.    Completed telephonic follow-up with patient after recent visit with Avila    Spoke to patient during outreach call.    Patient reports feeling: Improved    Patient has questions or concerns about medications: No    Have all prescribed medications been filled? Yes    Patient has necessary resources to manage their care? Yes    Patient has questions or concerns? No    Next care management follow-up approximately within one month.  Care  information provided to patient.

## 2025-05-01 LAB
NON-UH HIE A/G RATIO: 0.8
NON-UH HIE ALB: 3 G/DL (ref 3.4–5)
NON-UH HIE ALK PHOS: 64 UNIT/L (ref 45–117)
NON-UH HIE BILIRUBIN, TOTAL: 0.5 MG/DL (ref 0.3–1.2)
NON-UH HIE BUN/CREAT RATIO: 22.7
NON-UH HIE BUN: 25 MG/DL (ref 9–23)
NON-UH HIE CALCIUM: 9.3 MG/DL (ref 8.7–10.4)
NON-UH HIE CALCULATED LDL CHOLESTEROL: 61 MG/DL (ref 60–130)
NON-UH HIE CALCULATED OSMOLALITY: 282 MOSM/KG (ref 275–295)
NON-UH HIE CHLORIDE: 107 MMOL/L (ref 98–107)
NON-UH HIE CHOLESTEROL: 143 MG/DL (ref 100–200)
NON-UH HIE CO2, VENOUS: 25 MMOL/L (ref 20–31)
NON-UH HIE CREATININE: 1.1 MG/DL (ref 0.5–0.8)
NON-UH HIE GFR AA: 57
NON-UH HIE GLOBULIN: 3.9 G/DL
NON-UH HIE GLOMERULAR FILTRATION RATE: 47 ML/MIN/1.73M?
NON-UH HIE GLUCOSE: 90 MG/DL (ref 74–106)
NON-UH HIE GOT: 17 UNIT/L (ref 15–37)
NON-UH HIE GPT: 14 UNIT/L (ref 10–49)
NON-UH HIE HDL CHOLESTEROL: 69 MG/DL (ref 40–60)
NON-UH HIE HGB A1C: 5.8 %
NON-UH HIE K: 4.5 MMOL/L (ref 3.5–5.1)
NON-UH HIE NA: 139 MMOL/L (ref 135–145)
NON-UH HIE TOTAL CHOL/HDL CHOL RATIO: 2.1
NON-UH HIE TOTAL PROTEIN: 6.9 G/DL (ref 5.7–8.2)
NON-UH HIE TRIGLYCERIDES: 65 MG/DL (ref 30–150)

## 2025-05-29 ENCOUNTER — PATIENT OUTREACH (OUTPATIENT)
Dept: PRIMARY CARE | Facility: CLINIC | Age: 85
End: 2025-05-29
Payer: COMMERCIAL

## 2025-05-29 NOTE — PROGRESS NOTES
Successful outreach to patient regarding hospitalization as patient continues TCM program.   At time of outreach call the patient feels as if their condition has (improved/worsened/returned to baseline) since initial visit with PCP or specialist.  Questions or concerns addressed at this time with patient.   Provided contact information to patient if any further non-emergent needs arise.

## 2025-06-04 ENCOUNTER — TELEPHONE (OUTPATIENT)
Dept: RHEUMATOLOGY | Facility: CLINIC | Age: 85
End: 2025-06-04
Payer: COMMERCIAL

## 2025-06-04 NOTE — TELEPHONE ENCOUNTER
Pt calling today to let you know that she had an Endoscopy yesterday @ [facility].   She says she will be undergoing surgery soon, she wasn't sure what for.

## 2025-06-10 PROCEDURE — RXMED WILLOW AMBULATORY MEDICATION CHARGE

## 2025-06-12 ENCOUNTER — PHARMACY VISIT (OUTPATIENT)
Dept: PHARMACY | Facility: CLINIC | Age: 85
End: 2025-06-12
Payer: COMMERCIAL

## 2025-06-20 ENCOUNTER — APPOINTMENT (OUTPATIENT)
Dept: PHARMACY | Facility: HOSPITAL | Age: 85
End: 2025-06-20
Payer: COMMERCIAL

## 2025-06-20 DIAGNOSIS — R35.0 FREQUENCY OF URINATION: ICD-10-CM

## 2025-06-20 DIAGNOSIS — R39.15 URGENCY OF URINATION: ICD-10-CM

## 2025-06-20 DIAGNOSIS — N39.41 URGE INCONTINENCE: ICD-10-CM

## 2025-06-20 NOTE — PROGRESS NOTES
"  Patient ID: Yeni Almazan is a 85 y.o. female who presents for Urgency of urination.    Referring Provider: Noemí Duque   Pt was referred for cost assistance     Preferred Pharmacy:    [id number] [address]  [address] [facility] [address]  Phone: [phone] Fax: [phone]    [facility] - [place], [place] - [address]  [address]  [address]  [facility] [address]  Phone: [phone] Fax: [phone]    [facility]  [address], [address]  [facility] [address]  Phone: [phone] Fax: [phone]      Copay assistance:   Do you have copays on your medications? Yes  Do you have trouble affording your current medications? Yes     Patient Assistance Screening (VAF)    Patient verbally reports monthly or yearly income which is less than 400% federal poverty level   Application for program has been submitted for the following medications:   Myrbetriq   Patient has been informed that program team will be reaching out to them to discuss necessary documentation, instructed to answer phone/return voicemail.   Patient aware this process may take up to 6 weeks.   If approved medication must be filled through [facility] hospital pharmacy and may be picked up or mailed to patient.       Subjective      Vaginal Symptoms  Vaginal Dryness: No  Dysuria (pain, burning, stinging, or itching with urination): No  Vaginal and/or vulvar irritation/itching:  No  Recurrent urinary tract infections: Yes, about a month ago     Urinary Symptoms    Any history of:   Narrow-angle glaucoma? No  Impaired gastric emptying? No  Urinary retention? Yes - \"tilts body forward and is okay\"    If diabetes, blood sugar controlled? N/A  Frequently of bowel movement? Daily   Tobacco use? No  How many protective undergarments are you utilizing daily? Yes, pads daily     What medications have been tried/stopped?   Solifenacin - listed in chart, patient doesn't recall " "  Current medication? Myrbetriq 50 mg daily   When started? February 2024  Side effects? No   Improvement in symptoms? Yes      Cardiovascular Health  The ASCVD Risk score (Zackery IRAHETA, et al., 2019) failed to calculate for the following reasons:    The 2019 ASCVD risk score is only valid for ages 40 to 79    Risk score cannot be calculated because patient has a medical history suggesting prior/existing ASCVD    Lab Results   Component Value Date    CHOL 129 03/24/2023     Lab Results   Component Value Date    HDL 49.6 03/24/2023     No results found for: \"LDLCALC\"  Lab Results   Component Value Date    TRIG 78 03/24/2023     No components found for: \"CHOLHDL\"        Blood Sugar Balance  Lab Results   Component Value Date    GLUCOSE 98 10/26/2023    HGBA1C 5.9 (A) 03/24/2023    HGBA1C 6.0 (A) 03/23/2022    HGBA1C 6.2 09/15/2020     No results found for: \"LEPTIN\", \"INSULFAST\", \"GLUF\"      Thyroid  Lab Results   Component Value Date    TSH 2.08 11/08/2017    Z8NJNOJ 6.2 11/08/2017    X3OIKLR 79 11/08/2017       Iron Status  Lab Results   Component Value Date    IRON 58 03/24/2023    TIBC 372 03/24/2023    FERRITIN 77 03/24/2023        Kidney Function  Lab Results   Component Value Date    GFRF 37 (A) 03/24/2023    CREATININE 1.12 (H) 10/26/2023       Potassium  Lab Results   Component Value Date    K 4.6 10/26/2023        Vitamin D3  Lab Results   Component Value Date    VITD25 26 (A) 03/23/2022       Current Outpatient Medications on File Prior to Visit   Medication Sig Dispense Refill    acetaminophen (TylenoL) 325 mg capsule Take 1 capsule (325 mg) by mouth 3 times a day as needed.      albuterol (ProAir HFA) 90 mcg/actuation inhaler Inhale 2 puffs every 6 hours if needed for wheezing. 18 g 3    alendronate (Fosamax) 70 mg tablet Take 1 tablet (70 mg) by mouth every 7 days.      aluminum hydrox-magnesium carb (Gaviscon)  mg/15 mL suspension oral suspension Take by mouth if needed.      betamethasone, augmented, " (Diprolene AF) 0.05 % cream Apply topically 2 times a day.      calcitriol (Rocaltrol) 0.25 mcg capsule Take 1 capsule (0.25 mcg) by mouth. (Patient taking differently: Take 1 capsule (0.25 mcg) by mouth. MW)      calcium carbonate-vitamin D3 600 mg-10 mcg (400 unit) tablet Take 1 tablet by mouth once daily.      cyanocobalamin, vitamin B-12, 1,000 mcg tablet, sublingual Place 1 tablet (1,000 mcg) under the tongue once daily. 90 tablet 3    d-mannose 500 mg capsule Take 1 capsule (500 mg) by mouth 2 times a day.      dicyclomine (Bentyl) 10 mg capsule TAKE ONE CAPSULE BY MOUTH THREE TIMES A DAY AS NEEDED FOR SPASMS      ergocalciferol (Vitamin D-2) 1.25 MG (78423 UT) capsule Take 1 capsule (1,250 mcg) by mouth 1 (one) time per week. (Patient taking differently: Take 1 capsule (1.25 mg) by mouth 1 (one) time per week. 1 capsule twice a month)      estradiol (Estrace) 0.01 % (0.1 mg/gram) vaginal cream Apply pea size amount 0.5 gram to vaginal opening with finger daily for 2 weeks, then 2-3 times per week. 42.5 g 5    ferrous sulfate 325 (65 Fe) MG EC tablet Take 1 tablet by mouth. Every other day      hydroCHLOROthiazide (HYDRODiuril) 25 mg tablet Take 1 tablet (25 mg) by mouth once daily. 90 tablet 3    lisinopril 10 mg tablet Take 1 tablet (10 mg) by mouth once daily.      Lopressor 50 mg tablet 1.5 tablets (75 mg).      methIMAzole (Tapazole) 5 mg tablet Take 0.5 tablets (2.5 mg) by mouth once daily.      mirabegron (Myrbetriq) 50 mg tablet extended release 24 hr 24 hr tablet Take 1 tablet (50 mg) by mouth once daily. 30 tablet 11    omeprazole (PriLOSEC) 20 mg DR capsule Take 1 capsule (20 mg) by mouth 2 times a day.      rOPINIRole (Requip) 3 mg tablet Take 1 tablet (3 mg) by mouth 2 times a day. 60 tablet 11    sucralfate (Carafate) 1 gram tablet Take 1 tablet (1 g) by mouth 3 times a day.      torsemide (Demadex) 20 mg tablet Take 1 tablet (20 mg) by mouth 2 times a day.      Xarelto 20 mg tablet Take 1  tablet (20 mg) by mouth once daily in the evening. Take with meals. (Patient taking differently: 15 tablets (300 mg) once daily in the evening. Take with meals.)       No current facility-administered medications on file prior to visit.        Medication and allergy reconciliation completed     Drug Interactions   No significant drug interactions identified    Assessment/Plan     Patient is experiencing urinary urgency. Since starting Myrbetriq, patient reports her urinary urgency has improved. Will continue Myrbetriq through PAP to continue improvement/reduction of urinary urgency.    Has tried before solifenacin per chart review   Patient approved for [facility] through 12/3/2025    Myrbetriq  Discussed MOA: activates beta-3 adrenergic receptors in the bladder resulting in relaxation of the detrusor smooth muscle during the urine storage phase, thus increasing bladder capacity.  Provided education on administration (swallow whole with water; do not chew, divide, or crush) and potential side effects including but not limited to headache, nose or throat irritation, dry mouth, and constipation. Any signs or symptoms of angioedema- immediately discontinue use and seek immediate medical attention.   Monitor blood pressure and heart rate. May notice a slight increase. Please call me if blood pressure becomes >120/80 mmHg or pulse significantly elevates from baseline.   BP Readings from Last 3 Encounters:   04/17/25 106/70   03/12/25 (!) 126/97   09/30/24 92/63     Caution with other meds that prolong QT interval- many drug:drug interactions   Patient is in agreement that they will notify me if starting any new medications  Efficacy is seen within 8 weeks; steady state achieved within 7 days.      LABS  Lab Results   Component Value Date    GFRF 37 (A) 03/24/2023     GFR 57 ml/min as of 5/1/2025     CONTINUE  Myrbetriq 50 mg once daily     Follow-up: 11/4/25 at 11 am      Time spent with pt: Total length of time 15 (minutes)  of the encounter and more than 50% was spent counseling the patient.    Mercedes Connor PharmD     Patient Assistance Program Approval:     We are pleased to inform you that your application for assistance has been approved.     This approval is valid through 12/3/2025 as long as the following criteria continue to be satisfied:     Your medication (Myrbetriq) remains covered under your current insurance plan.   Your prescriber does not discontinue therapy.   You do not seek reimbursement from any other private or government-funded programs for the  medication.    Under this program, the pharmacy will first bill your insurance plan for your indemnified specified medication. The [employer] Fund will then offset your copay balance, so that your out-of pocket expense for your specialty medication will be $0.00.    Mercedes Connor PharmD     Continue all meds under the continuation of care with the referring provider and clinical pharmacy team.    Verbal consent to manage patient's drug therapy was obtained from the patient and/or an individual authorized to act on behalf of a patient. They were informed they may decline to participate or withdraw from participation in pharmacy services at any time.

## 2025-06-26 ENCOUNTER — PATIENT OUTREACH (OUTPATIENT)
Dept: PRIMARY CARE | Facility: CLINIC | Age: 85
End: 2025-06-26
Payer: COMMERCIAL

## 2025-06-26 NOTE — PROGRESS NOTES
Final call.  CM called and spoke with Patient to address any final questions or concerns regarding hospitalization.  Pt reports doing well and has no concerns at this time.  Pt states she will be having surgery soon. Pt given my contact info  in the event questions should arise.

## 2025-07-14 LAB
NON-UH HIE ALB: 3.4 G/DL (ref 3.4–5)
NON-UH HIE APPEARANCE, U: ABNORMAL
NON-UH HIE BACTERIA, U: ABNORMAL
NON-UH HIE BASO COUNT: 0.04 X1000 (ref 0–0.2)
NON-UH HIE BASOS %: 0.7 %
NON-UH HIE BILIRUBIN, U: ABNORMAL
NON-UH HIE BLOOD, U: ABNORMAL
NON-UH HIE BUN/CREAT RATIO: 28.5
NON-UH HIE BUN: 37 MG/DL (ref 9–23)
NON-UH HIE CALCIUM: 9.8 MG/DL (ref 8.7–10.4)
NON-UH HIE CALCULATED OSMOLALITY: 290 MOSM/KG (ref 275–295)
NON-UH HIE CHLORIDE: 108 MMOL/L (ref 98–107)
NON-UH HIE CO2, VENOUS: 26 MMOL/L (ref 20–31)
NON-UH HIE COLOR, U: ABNORMAL
NON-UH HIE CORRECTED CALCIUM: 10.3 MG/DL (ref 8.5–10.5)
NON-UH HIE CREATININE, URINE MG/DL: 52.5 MG/DL
NON-UH HIE CREATININE: 1.3 MG/DL (ref 0.5–0.8)
NON-UH HIE DIFF?: ABNORMAL
NON-UH HIE EOS COUNT: 0.13 X1000 (ref 0–0.5)
NON-UH HIE EOSIN %: 2.2 %
NON-UH HIE GFR AA: 47
NON-UH HIE GLOMERULAR FILTRATION RATE: 39 ML/MIN/1.73M?
NON-UH HIE GLUCOSE QUAL, U: ABNORMAL
NON-UH HIE GLUCOSE: 94 MG/DL (ref 74–106)
NON-UH HIE HCT: 38.8 % (ref 36–46)
NON-UH HIE HGB: 12.8 G/DL (ref 12–16)
NON-UH HIE HYALINE CAST: <1 #/HPF
NON-UH HIE I-PTH: 71 PG/ML (ref 18.4–80.1)
NON-UH HIE INSTR WBC: 5.8
NON-UH HIE K: 4.2 MMOL/L (ref 3.5–5.1)
NON-UH HIE KETONES, U: ABNORMAL
NON-UH HIE LEUKOCYTE ESTERASE, U: ABNORMAL
NON-UH HIE LYMPH %: 17.2 %
NON-UH HIE LYMPH COUNT: 1 X1000 (ref 1.2–4.8)
NON-UH HIE MAGNESIUM: 1.9 MG/DL (ref 1.6–2.6)
NON-UH HIE MCH: 30.2 PG (ref 27–34)
NON-UH HIE MCHC: 32.9 G/DL (ref 32–37)
NON-UH HIE MCV: 91.9 FL (ref 80–100)
NON-UH HIE MICROALBUMIN, URINE MG/L: 5 MG/L
NON-UH HIE MICROALBUMIN/CREATININE RATIO: 10 MG MALB/GM CREAT (ref 0–30)
NON-UH HIE MONO %: 11.3 %
NON-UH HIE MONO COUNT: 0.66 X1000 (ref 0.1–1)
NON-UH HIE MPV: 8.2 FL (ref 7.4–10.4)
NON-UH HIE NA: 141 MMOL/L (ref 135–145)
NON-UH HIE NEUTROPHIL %: 68.6 %
NON-UH HIE NEUTROPHIL COUNT (ANC): 4 X1000 (ref 1.4–8.8)
NON-UH HIE NITRITE, U: ABNORMAL
NON-UH HIE NUCLEATED RBC: 0 /100WBC
NON-UH HIE PH, U: 5.5 (ref 4.5–8)
NON-UH HIE PHOSPHORUS: 3.2 MG/DL (ref 2–5.1)
NON-UH HIE PLATELET: 223 X10 (ref 150–450)
NON-UH HIE PROTEIN, U: ABNORMAL
NON-UH HIE RBC/HPF, U: 1 #/HPF (ref 0–3)
NON-UH HIE RBC: 4.22 X10 (ref 4.2–5.4)
NON-UH HIE RDW: 15.5 % (ref 11.5–14.5)
NON-UH HIE SPECIFIC GRAVITY, U: 1.01 (ref 1–1.03)
NON-UH HIE SQUAMOUS EPITHELIAL CELLS, U: 4 #/HPF
NON-UH HIE U MICRO: ABNORMAL
NON-UH HIE URIC ACID: 6.4 MG/DL (ref 3.1–7.8)
NON-UH HIE UROBILINOGEN QUAL, U: ABNORMAL
NON-UH HIE VIT D 25: 68 NG/ML
NON-UH HIE WBC/HPF, U: 2 #/HPF (ref 0–5)
NON-UH HIE WBC: 5.8 X10 (ref 4.5–11)

## 2025-09-08 ENCOUNTER — APPOINTMENT (OUTPATIENT)
Dept: PRIMARY CARE | Facility: CLINIC | Age: 85
End: 2025-09-08
Payer: COMMERCIAL

## 2025-10-09 ENCOUNTER — APPOINTMENT (OUTPATIENT)
Dept: UROLOGY | Facility: CLINIC | Age: 85
End: 2025-10-09
Payer: MEDICARE

## 2025-10-16 ENCOUNTER — APPOINTMENT (OUTPATIENT)
Dept: UROLOGY | Facility: CLINIC | Age: 85
End: 2025-10-16
Payer: MEDICARE

## 2025-11-04 ENCOUNTER — APPOINTMENT (OUTPATIENT)
Dept: PHARMACY | Facility: HOSPITAL | Age: 85
End: 2025-11-04
Payer: COMMERCIAL

## 2026-03-09 ENCOUNTER — APPOINTMENT (OUTPATIENT)
Dept: PRIMARY CARE | Facility: CLINIC | Age: 86
End: 2026-03-09
Payer: COMMERCIAL